# Patient Record
Sex: MALE | Race: WHITE | NOT HISPANIC OR LATINO | Employment: OTHER | URBAN - METROPOLITAN AREA
[De-identification: names, ages, dates, MRNs, and addresses within clinical notes are randomized per-mention and may not be internally consistent; named-entity substitution may affect disease eponyms.]

---

## 2020-12-22 ENCOUNTER — HOSPITAL ENCOUNTER (OUTPATIENT)
Dept: MEDSURG UNIT | Facility: HOSPITAL | Age: 65
End: 2020-12-23
Attending: INTERNAL MEDICINE | Admitting: INTERNAL MEDICINE

## 2020-12-23 LAB
ABS NEUT (OLG): 4.85 X10(3)/MCL (ref 2.1–9.2)
ALBUMIN SERPL-MCNC: 3.5 GM/DL (ref 3.4–4.8)
ALBUMIN/GLOB SERPL: 1.2 RATIO (ref 1.1–2)
ALP SERPL-CCNC: 78 UNIT/L (ref 40–150)
ALT SERPL-CCNC: 18 UNIT/L (ref 0–55)
AST SERPL-CCNC: 16 UNIT/L (ref 5–34)
BASOPHILS # BLD AUTO: 0 X10(3)/MCL (ref 0–0.2)
BASOPHILS NFR BLD AUTO: 0 %
BILIRUB SERPL-MCNC: 0.7 MG/DL
BILIRUBIN DIRECT+TOT PNL SERPL-MCNC: 0.3 MG/DL (ref 0–0.5)
BILIRUBIN DIRECT+TOT PNL SERPL-MCNC: 0.4 MG/DL (ref 0–0.8)
BUN SERPL-MCNC: 13.3 MG/DL (ref 8.4–25.7)
CALCIUM SERPL-MCNC: 8.3 MG/DL (ref 8.8–10)
CHLORIDE SERPL-SCNC: 112 MMOL/L (ref 98–107)
CO2 SERPL-SCNC: 22 MMOL/L (ref 23–31)
CREAT SERPL-MCNC: 0.96 MG/DL (ref 0.73–1.18)
EOSINOPHIL # BLD AUTO: 0.2 X10(3)/MCL (ref 0–0.9)
EOSINOPHIL NFR BLD AUTO: 2 %
ERYTHROCYTE [DISTWIDTH] IN BLOOD BY AUTOMATED COUNT: 11.8 % (ref 11.5–17)
GLOBULIN SER-MCNC: 3 GM/DL (ref 2.4–3.5)
GLUCOSE SERPL-MCNC: 94 MG/DL (ref 82–115)
HCT VFR BLD AUTO: 43 % (ref 42–52)
HGB BLD-MCNC: 14.6 GM/DL (ref 14–18)
LYMPHOCYTES # BLD AUTO: 1.4 X10(3)/MCL (ref 0.6–4.6)
LYMPHOCYTES NFR BLD AUTO: 19 %
MCH RBC QN AUTO: 33.5 PG (ref 27–31)
MCHC RBC AUTO-ENTMCNC: 34 GM/DL (ref 33–36)
MCV RBC AUTO: 98.6 FL (ref 80–94)
MONOCYTES # BLD AUTO: 0.7 X10(3)/MCL (ref 0.1–1.3)
MONOCYTES NFR BLD AUTO: 9 %
NEUTROPHILS # BLD AUTO: 4.85 X10(3)/MCL (ref 2.1–9.2)
NEUTROPHILS NFR BLD AUTO: 68 %
PLATELET # BLD AUTO: 180 X10(3)/MCL (ref 130–400)
PMV BLD AUTO: 9.8 FL (ref 9.4–12.4)
POTASSIUM SERPL-SCNC: 3.9 MMOL/L (ref 3.5–5.1)
PROT SERPL-MCNC: 6.5 GM/DL (ref 5.8–7.6)
RBC # BLD AUTO: 4.36 X10(6)/MCL (ref 4.7–6.1)
SODIUM SERPL-SCNC: 140 MMOL/L (ref 136–145)
WBC # SPEC AUTO: 7.1 X10(3)/MCL (ref 4.5–11.5)

## 2022-04-11 ENCOUNTER — HISTORICAL (OUTPATIENT)
Dept: ADMINISTRATIVE | Facility: HOSPITAL | Age: 67
End: 2022-04-11
Payer: COMMERCIAL

## 2022-04-24 VITALS
SYSTOLIC BLOOD PRESSURE: 158 MMHG | BODY MASS INDEX: 29.05 KG/M2 | DIASTOLIC BLOOD PRESSURE: 82 MMHG | WEIGHT: 180.75 LBS | HEIGHT: 66 IN

## 2022-06-25 ENCOUNTER — HOSPITAL ENCOUNTER (OUTPATIENT)
Facility: HOSPITAL | Age: 67
Discharge: HOME | End: 2022-06-26
Attending: EMERGENCY MEDICINE | Admitting: INTERNAL MEDICINE
Payer: COMMERCIAL

## 2022-06-25 DIAGNOSIS — R55 SYNCOPE, UNSPECIFIED SYNCOPE TYPE: ICD-10-CM

## 2022-06-25 DIAGNOSIS — I95.9 HYPOTENSION, UNSPECIFIED HYPOTENSION TYPE: ICD-10-CM

## 2022-06-25 DIAGNOSIS — E86.0 DEHYDRATION: ICD-10-CM

## 2022-06-25 DIAGNOSIS — R56.9 SEIZURE: ICD-10-CM

## 2022-06-25 DIAGNOSIS — N17.9 AKI (ACUTE KIDNEY INJURY): ICD-10-CM

## 2022-06-25 DIAGNOSIS — R55 SYNCOPE: ICD-10-CM

## 2022-06-25 DIAGNOSIS — I95.9 ACUTE HYPOTENSION: Primary | ICD-10-CM

## 2022-06-25 DIAGNOSIS — I25.10 CORONARY ARTERY DISEASE INVOLVING NATIVE CORONARY ARTERY OF NATIVE HEART WITHOUT ANGINA PECTORIS: ICD-10-CM

## 2022-06-25 LAB
ALBUMIN SERPL-MCNC: 4.1 GM/DL (ref 3.4–4.8)
ALBUMIN/GLOB SERPL: 1.4 RATIO (ref 1.1–2)
ALP SERPL-CCNC: 71 UNIT/L (ref 40–150)
ALT SERPL-CCNC: 34 UNIT/L (ref 0–55)
APPEARANCE UR: CLEAR
AST SERPL-CCNC: 22 UNIT/L (ref 5–34)
BACTERIA #/AREA URNS AUTO: ABNORMAL /HPF
BASOPHILS # BLD AUTO: 0.06 X10(3)/MCL (ref 0–0.2)
BASOPHILS NFR BLD AUTO: 0.4 %
BILIRUB UR QL STRIP.AUTO: NEGATIVE MG/DL
BILIRUBIN DIRECT+TOT PNL SERPL-MCNC: 0.8 MG/DL
BUN SERPL-MCNC: 24.4 MG/DL (ref 8.4–25.7)
CALCIUM SERPL-MCNC: 9.7 MG/DL (ref 8.8–10)
CHLORIDE SERPL-SCNC: 113 MMOL/L (ref 98–107)
CHOLEST SERPL-MCNC: 115 MG/DL
CHOLEST/HDLC SERPL: 3 {RATIO} (ref 0–5)
CK SERPL-CCNC: 83 U/L (ref 30–200)
CO2 SERPL-SCNC: 20 MMOL/L (ref 23–31)
COLOR UR AUTO: YELLOW
CREAT SERPL-MCNC: 2.23 MG/DL (ref 0.73–1.18)
EOSINOPHIL # BLD AUTO: 0.03 X10(3)/MCL (ref 0–0.9)
EOSINOPHIL NFR BLD AUTO: 0.2 %
ERYTHROCYTE [DISTWIDTH] IN BLOOD BY AUTOMATED COUNT: 12.2 % (ref 11.5–17)
EST. AVERAGE GLUCOSE BLD GHB EST-MCNC: 99.7 MG/DL
FLUAV AG UPPER RESP QL IA.RAPID: NOT DETECTED
FLUBV AG UPPER RESP QL IA.RAPID: NOT DETECTED
FOLATE SERPL-MCNC: 7.5 NG/ML (ref 7–31.4)
GLOBULIN SER-MCNC: 2.9 GM/DL (ref 2.4–3.5)
GLUCOSE SERPL-MCNC: 98 MG/DL (ref 82–115)
GLUCOSE UR QL STRIP.AUTO: NEGATIVE MG/DL
HBA1C MFR BLD: 5.1 %
HCT VFR BLD AUTO: 47 % (ref 42–52)
HDLC SERPL-MCNC: 38 MG/DL (ref 35–60)
HGB BLD-MCNC: 15.2 GM/DL (ref 14–18)
IMM GRANULOCYTES # BLD AUTO: 0.13 X10(3)/MCL (ref 0–0.02)
IMM GRANULOCYTES NFR BLD AUTO: 0.9 % (ref 0–0.43)
KETONES UR QL STRIP.AUTO: ABNORMAL MG/DL
LDLC SERPL CALC-MCNC: 57 MG/DL (ref 50–140)
LEFT CCA DIST SYS: 107 CM/S
LEFT CCA PROX SYS: 96 CM/S
LEFT ECA SYS: 144 CM/S
LEFT ICA DIST DIAS: 19 CM/S
LEFT ICA DIST SYS: 50 CM/S
LEFT ICA MID DIAS: 21 CM/S
LEFT ICA MID SYS: 70 CM/S
LEFT ICA PROX DIAS: 16 CM/S
LEFT ICA PROX SYS: 91 CM/S
LEFT VERTEBRAL SYS: 50 CM/S
LEUKOCYTE ESTERASE UR QL STRIP.AUTO: ABNORMAL UNIT/L
LYMPHOCYTES # BLD AUTO: 1.2 X10(3)/MCL (ref 0.6–4.6)
LYMPHOCYTES NFR BLD AUTO: 8.4 %
MCH RBC QN AUTO: 32.8 PG (ref 27–31)
MCHC RBC AUTO-ENTMCNC: 32.3 MG/DL (ref 33–36)
MCV RBC AUTO: 101.5 FL (ref 80–94)
MONOCYTES # BLD AUTO: 1.02 X10(3)/MCL (ref 0.1–1.3)
MONOCYTES NFR BLD AUTO: 7.2 %
NEUTROPHILS # BLD AUTO: 11.8 X10(3)/MCL (ref 2.1–9.2)
NEUTROPHILS NFR BLD AUTO: 82.9 %
NITRITE UR QL STRIP.AUTO: NEGATIVE
NRBC BLD AUTO-RTO: 0 %
OHS CV CAROTID RIGHT ICA EDV HIGHEST: 27
OHS CV CAROTID ULTRASOUND LEFT ICA/CCA RATIO: 0.85
OHS CV CAROTID ULTRASOUND RIGHT ICA/CCA RATIO: 0.59
OHS CV PV CAROTID LEFT HIGHEST CCA: 107
OHS CV PV CAROTID LEFT HIGHEST ICA: 91
OHS CV PV CAROTID RIGHT HIGHEST CCA: 136
OHS CV PV CAROTID RIGHT HIGHEST ICA: 80
OHS CV US CAROTID LEFT HIGHEST EDV: 21
PH UR STRIP.AUTO: 5.5 [PH]
PLATELET # BLD AUTO: 182 X10(3)/MCL (ref 130–400)
PMV BLD AUTO: 9.6 FL (ref 9.4–12.4)
POTASSIUM SERPL-SCNC: 4.9 MMOL/L (ref 3.5–5.1)
PROT SERPL-MCNC: 7 GM/DL (ref 5.8–7.6)
PROT UR QL STRIP.AUTO: ABNORMAL MG/DL
RBC # BLD AUTO: 4.63 X10(6)/MCL (ref 4.7–6.1)
RBC #/AREA URNS AUTO: <5 /HPF
RBC UR QL AUTO: NEGATIVE UNIT/L
RIGHT CCA DIST SYS: 136 CM/S
RIGHT CCA PROX SYS: 136 CM/S
RIGHT ECA SYS: 230 CM/S
RIGHT ICA DIST DIAS: 22 CM/S
RIGHT ICA DIST SYS: 69 CM/S
RIGHT ICA MID DIAS: 27 CM/S
RIGHT ICA MID SYS: 72 CM/S
RIGHT ICA PROX DIAS: 20 CM/S
RIGHT ICA PROX SYS: 80 CM/S
RIGHT VERTEBRAL SYS: 20 CM/S
SARS-COV-2 RNA RESP QL NAA+PROBE: NOT DETECTED
SODIUM SERPL-SCNC: 141 MMOL/L (ref 136–145)
SP GR UR STRIP.AUTO: 1.02 (ref 1–1.03)
SQUAMOUS #/AREA URNS AUTO: <5 /HPF
TRIGL SERPL-MCNC: 99 MG/DL (ref 34–140)
TROPONIN I SERPL-MCNC: <0.01 NG/ML (ref 0–0.04)
UROBILINOGEN UR STRIP-ACNC: 0.2 MG/DL
VIT B12 SERPL-MCNC: 355 PG/ML (ref 213–816)
VLDLC SERPL CALC-MCNC: 20 MG/DL
WBC # SPEC AUTO: 14.2 X10(3)/MCL (ref 4.5–11.5)
WBC #/AREA URNS AUTO: 10 /HPF

## 2022-06-25 PROCEDURE — 25000003 PHARM REV CODE 250: Performed by: EMERGENCY MEDICINE

## 2022-06-25 PROCEDURE — 82746 ASSAY OF FOLIC ACID SERUM: CPT | Performed by: INTERNAL MEDICINE

## 2022-06-25 PROCEDURE — 96360 HYDRATION IV INFUSION INIT: CPT | Mod: 59

## 2022-06-25 PROCEDURE — 84484 ASSAY OF TROPONIN QUANT: CPT | Performed by: EMERGENCY MEDICINE

## 2022-06-25 PROCEDURE — 63600175 PHARM REV CODE 636 W HCPCS: Performed by: EMERGENCY MEDICINE

## 2022-06-25 PROCEDURE — 80053 COMPREHEN METABOLIC PANEL: CPT | Performed by: EMERGENCY MEDICINE

## 2022-06-25 PROCEDURE — 82607 VITAMIN B-12: CPT | Performed by: INTERNAL MEDICINE

## 2022-06-25 PROCEDURE — 36415 COLL VENOUS BLD VENIPUNCTURE: CPT | Performed by: EMERGENCY MEDICINE

## 2022-06-25 PROCEDURE — G0378 HOSPITAL OBSERVATION PER HR: HCPCS

## 2022-06-25 PROCEDURE — 96372 THER/PROPH/DIAG INJ SC/IM: CPT | Performed by: INTERNAL MEDICINE

## 2022-06-25 PROCEDURE — 25000003 PHARM REV CODE 250: Performed by: INTERNAL MEDICINE

## 2022-06-25 PROCEDURE — 81001 URINALYSIS AUTO W/SCOPE: CPT | Performed by: EMERGENCY MEDICINE

## 2022-06-25 PROCEDURE — 93005 ELECTROCARDIOGRAM TRACING: CPT

## 2022-06-25 PROCEDURE — 96361 HYDRATE IV INFUSION ADD-ON: CPT

## 2022-06-25 PROCEDURE — 82550 ASSAY OF CK (CPK): CPT | Performed by: EMERGENCY MEDICINE

## 2022-06-25 PROCEDURE — 85025 COMPLETE CBC W/AUTO DIFF WBC: CPT | Performed by: EMERGENCY MEDICINE

## 2022-06-25 PROCEDURE — 83036 HEMOGLOBIN GLYCOSYLATED A1C: CPT | Performed by: PHYSICIAN ASSISTANT

## 2022-06-25 PROCEDURE — 80061 LIPID PANEL: CPT | Performed by: PHYSICIAN ASSISTANT

## 2022-06-25 PROCEDURE — 99291 CRITICAL CARE FIRST HOUR: CPT | Mod: 25

## 2022-06-25 PROCEDURE — 36415 COLL VENOUS BLD VENIPUNCTURE: CPT | Performed by: PHYSICIAN ASSISTANT

## 2022-06-25 PROCEDURE — 87636 SARSCOV2 & INF A&B AMP PRB: CPT | Performed by: EMERGENCY MEDICINE

## 2022-06-25 PROCEDURE — 63600175 PHARM REV CODE 636 W HCPCS: Performed by: INTERNAL MEDICINE

## 2022-06-25 RX ORDER — SODIUM CHLORIDE 0.9 % (FLUSH) 0.9 %
10 SYRINGE (ML) INJECTION EVERY 12 HOURS PRN
Status: DISCONTINUED | OUTPATIENT
Start: 2022-06-25 | End: 2022-06-26 | Stop reason: HOSPADM

## 2022-06-25 RX ORDER — ACETAMINOPHEN 325 MG/1
650 TABLET ORAL EVERY 8 HOURS PRN
Status: DISCONTINUED | OUTPATIENT
Start: 2022-06-25 | End: 2022-06-26 | Stop reason: HOSPADM

## 2022-06-25 RX ORDER — PANTOPRAZOLE SODIUM 40 MG/1
40 TABLET, DELAYED RELEASE ORAL DAILY
COMMUNITY

## 2022-06-25 RX ORDER — ENOXAPARIN SODIUM 100 MG/ML
30 INJECTION SUBCUTANEOUS EVERY 24 HOURS
Status: DISCONTINUED | OUTPATIENT
Start: 2022-06-25 | End: 2022-06-26 | Stop reason: HOSPADM

## 2022-06-25 RX ORDER — ATORVASTATIN CALCIUM 10 MG/1
20 TABLET, FILM COATED ORAL NIGHTLY
Status: DISCONTINUED | OUTPATIENT
Start: 2022-06-25 | End: 2022-06-26 | Stop reason: HOSPADM

## 2022-06-25 RX ORDER — ZONISAMIDE 100 MG/1
100 CAPSULE ORAL NIGHTLY
Status: DISCONTINUED | OUTPATIENT
Start: 2022-06-25 | End: 2022-06-26 | Stop reason: HOSPADM

## 2022-06-25 RX ORDER — LISINOPRIL 5 MG/1
5 TABLET ORAL DAILY
COMMUNITY
End: 2023-02-10

## 2022-06-25 RX ORDER — METOPROLOL TARTRATE 25 MG/1
25 TABLET, FILM COATED ORAL 2 TIMES DAILY
Status: DISCONTINUED | OUTPATIENT
Start: 2022-06-25 | End: 2022-06-26 | Stop reason: HOSPADM

## 2022-06-25 RX ORDER — IBUPROFEN 200 MG
16 TABLET ORAL
Status: DISCONTINUED | OUTPATIENT
Start: 2022-06-25 | End: 2022-06-26 | Stop reason: HOSPADM

## 2022-06-25 RX ORDER — ASPIRIN 81 MG/1
81 TABLET ORAL DAILY
COMMUNITY

## 2022-06-25 RX ORDER — SODIUM CHLORIDE 9 MG/ML
INJECTION, SOLUTION INTRAVENOUS CONTINUOUS
Status: DISCONTINUED | OUTPATIENT
Start: 2022-06-25 | End: 2022-06-25

## 2022-06-25 RX ORDER — ZONISAMIDE 100 MG/1
100 CAPSULE ORAL NIGHTLY
COMMUNITY
End: 2023-02-10

## 2022-06-25 RX ORDER — SODIUM CHLORIDE, SODIUM LACTATE, POTASSIUM CHLORIDE, CALCIUM CHLORIDE 600; 310; 30; 20 MG/100ML; MG/100ML; MG/100ML; MG/100ML
INJECTION, SOLUTION INTRAVENOUS CONTINUOUS
Status: DISCONTINUED | OUTPATIENT
Start: 2022-06-25 | End: 2022-06-26 | Stop reason: HOSPADM

## 2022-06-25 RX ORDER — METOPROLOL TARTRATE 25 MG/1
25 TABLET, FILM COATED ORAL 2 TIMES DAILY
COMMUNITY
End: 2023-02-10

## 2022-06-25 RX ORDER — LEVETIRACETAM 750 MG/1
1500 TABLET ORAL 2 TIMES DAILY
COMMUNITY
End: 2022-09-09 | Stop reason: SDUPTHER

## 2022-06-25 RX ORDER — TAMSULOSIN HYDROCHLORIDE 0.4 MG/1
CAPSULE ORAL DAILY
COMMUNITY

## 2022-06-25 RX ORDER — GLUCAGON 1 MG
1 KIT INJECTION
Status: DISCONTINUED | OUTPATIENT
Start: 2022-06-25 | End: 2022-06-26 | Stop reason: HOSPADM

## 2022-06-25 RX ORDER — IBUPROFEN 200 MG
24 TABLET ORAL
Status: DISCONTINUED | OUTPATIENT
Start: 2022-06-25 | End: 2022-06-26 | Stop reason: HOSPADM

## 2022-06-25 RX ORDER — ATORVASTATIN CALCIUM 20 MG/1
20 TABLET, FILM COATED ORAL NIGHTLY
COMMUNITY

## 2022-06-25 RX ORDER — ONDANSETRON 2 MG/ML
4 INJECTION INTRAMUSCULAR; INTRAVENOUS EVERY 4 HOURS PRN
Status: DISCONTINUED | OUTPATIENT
Start: 2022-06-25 | End: 2022-06-26 | Stop reason: HOSPADM

## 2022-06-25 RX ORDER — ASPIRIN 81 MG/1
81 TABLET ORAL DAILY
Status: DISCONTINUED | OUTPATIENT
Start: 2022-06-26 | End: 2022-06-26 | Stop reason: HOSPADM

## 2022-06-25 RX ORDER — LEVETIRACETAM 500 MG/1
1500 TABLET ORAL 2 TIMES DAILY
Status: DISCONTINUED | OUTPATIENT
Start: 2022-06-25 | End: 2022-06-26 | Stop reason: HOSPADM

## 2022-06-25 RX ORDER — ACETAMINOPHEN 325 MG/1
650 TABLET ORAL EVERY 4 HOURS PRN
Status: DISCONTINUED | OUTPATIENT
Start: 2022-06-25 | End: 2022-06-26 | Stop reason: HOSPADM

## 2022-06-25 RX ORDER — NITROGLYCERIN 0.4 MG/1
0.4 TABLET SUBLINGUAL EVERY 5 MIN PRN
COMMUNITY

## 2022-06-25 RX ORDER — TAMSULOSIN HYDROCHLORIDE 0.4 MG/1
0.4 CAPSULE ORAL DAILY
Status: DISCONTINUED | OUTPATIENT
Start: 2022-06-26 | End: 2022-06-26 | Stop reason: HOSPADM

## 2022-06-25 RX ADMIN — ENOXAPARIN SODIUM 30 MG: 30 INJECTION SUBCUTANEOUS at 06:06

## 2022-06-25 RX ADMIN — ATORVASTATIN CALCIUM 20 MG: 10 TABLET, FILM COATED ORAL at 08:06

## 2022-06-25 RX ADMIN — METOPROLOL TARTRATE 25 MG: 25 TABLET, FILM COATED ORAL at 08:06

## 2022-06-25 RX ADMIN — SODIUM CHLORIDE, POTASSIUM CHLORIDE, SODIUM LACTATE AND CALCIUM CHLORIDE: 600; 310; 30; 20 INJECTION, SOLUTION INTRAVENOUS at 03:06

## 2022-06-25 RX ADMIN — SODIUM CHLORIDE 1000 ML: 9 INJECTION, SOLUTION INTRAVENOUS at 01:06

## 2022-06-25 RX ADMIN — LEVETIRACETAM 1500 MG: 500 TABLET, FILM COATED ORAL at 08:06

## 2022-06-25 NOTE — H&P
[facility] Medicine History & Physical Examination       Patient Name: Michael Larson  MRN: [id number]  Patient Class: OP- Observation   Admission Date: 6/25/2022   Admitting Physician: LEEROY Service   Length of Stay: 0  Attending Physician: Brock Rodriguez MD  Primary Care Provider: No primary care provider on file.  Face-to-Face encounter date: 06/25/2022  Code Status: Full  Chief Complaint: Loss of Consciousness        Patient information was obtained from patient, patient's family, past medical records and ER records.     HISTORY OF PRESENT ILLNESS:   Michael Larson is a 66 y.o. male past medical history of hypertension, hyperlipidemia, coronary artery disease status post MI/PCI x3 total 2 years ago, seizure disorder, BPH admitted 06/25/2022 after he was brought into ED by [facility] .  History was provided by the patient and his wife in the room.  Patient reported that his blood pressure has been running normal but this morning he to his blood pressure mention and he and his wife went for fishing trip.  Stated he remained in the shade as well as was hydrating well but around mid day, he started feeling weak.  Developed spots in his vision which grew stronger and then he became flaccid and in able to move and unable to speak to his wife.  EMS was called in and his systolic blood pressure was reported to be in 80s.  Patient received fluid boluses in the ED and his blood pressure responded.  Patient denied any chest pain, shortness of breath.  Patient reported in the last week he was twice cutting the grass on his tractor and has been trying to hydrate himself.      Upon my evaluation in the ED, patient provided me with the whole history, was able to answer all my questions. He does seems a little anxious  Discussed with patient that it looks like that he is severely dehydrated given his creatinine has bumped up to 2.2, which was normal last time.  Discusses CT of the head is negative.   Rest of the labs showed MCV of 101. Patient denied alcohol use states he drinks only occasionally.  Discussed will order folic acid and B12 levels, mild leukocytosis which could be reactive to dehydration.  His lipid profile is at goal, troponin is less than 0.010  A1c is 5.1, nondiabetic  Influenza a and B and COVID is negative  Urinalysis shows trace ketones and trace leukocyte with no bacteria and nitrite  Carotid ultrasound and echocardiogram are pending reports    Discussed will admit him under observation, will hydrate him, and if renal functions normalized by tomorrow with hydration, possible discharge home.  Answered all their questions to the best of my knowledge in their satisfaction    PAST MEDICAL HISTORY:   Hypertension  Hyperlipidemia  Coronary artery disease/MI/PCI x3  Seizure disorder  BPH    PAST SURGICAL HISTORY:   Coronary angiogram with PCI    ALLERGIES:   Patient has no known allergies.    FAMILY HISTORY:   Father diseased, had Alzheimer's.   in his 80s  Mother diseased with tuberculosis at age 42    SOCIAL HISTORY:     Social History     Tobacco Use    Smoking status: Never smoker    Smokeless tobacco: Never   Substance Use Topics    Alcohol use:  Drugs: Occasional  Never          HOME MEDICATIONS:     Prior to Admission medications    Medication Sig Start Date End Date Taking? Authorizing Provider   aspirin (ECOTRIN) 81 MG EC tablet Take 81 mg by mouth once daily.   Yes Historical Provider   atorvastatin (LIPITOR) 20 MG tablet Take 20 mg by mouth every evening.   Yes Historical Provider   levETIRAcetam (KEPPRA) 750 MG Tab Take 1,500 mg by mouth 2 (two) times daily.   Yes Historical Provider   lisinopriL (PRINIVIL,ZESTRIL) 5 MG tablet Take 5 mg by mouth once daily.   Yes Historical Provider   metoprolol tartrate (LOPRESSOR) 25 MG tablet Take 25 mg by mouth 2 (two) times daily.   Yes Historical Provider   pantoprazole (PROTONIX) 40 MG tablet Take 40 mg by mouth once daily.   Yes  Historical Provider   tamsulosin (FLOMAX) 0.4 mg Cap Take by mouth once daily.   Yes Historical Provider   zonisamide (ZONEGRAN) 100 MG Cap Take 100 mg by mouth nightly.   Yes Historical Provider   nitroGLYCERIN (NITROSTAT) 0.4 MG SL tablet Place 0.4 mg under the tongue every 5 (five) minutes as needed for Chest pain.    Historical Provider       REVIEW OF SYSTEMS:   Except as documented, all other systems reviewed and negative     PHYSICAL EXAM:     VITAL SIGNS: 24 HRS MIN & MAX LAST   No data recorded     BP  Min: 88/56  Max: 147/74 112/73   Pulse  Min: 82  Max: 95  86   Resp  Min: 16  Max: 20 20   SpO2  Min: 96 %  Max: 100 % 96 %       General appearance: Well-developed, well-nourished male in no apparent distress.  HENT: Atraumatic head. Moist mucous membranes of oral cavity.  Slight stutter in speech- wife reported as normal when he is nervous  Eyes: Normal extraocular movements.   Neck: Supple.   Lungs: Clear to auscultation bilaterally. No wheezing present.   Heart: Regular rate and rhythm. S1 and S2 present with no murmurs/gallop/rub. No pedal edema. No JVD present.   Abdomen: Soft, non-distended, non-tender. No rebound tenderness/guarding. Bowel sounds are normal.   Extremities: No cyanosis, clubbing, or edema.  Skin: No Rash.   Neuro: Motor and sensory exams grossly intact. Good tone. Muscle strength 5/5 in all 4 extremities, no neurologic deficit  Psych/mental status: Appropriate mood and affect. Responds appropriately to questions.     LABS AND IMAGING:     Recent Labs   Lab 06/25/22  1345   WBC 14.2*   RBC 4.63*   HGB 15.2   HCT 47.0   .5*   MCH 32.8*   MCHC 32.3*   RDW 12.2      MPV 9.6       Recent Labs   Lab 06/25/22  1345      K 4.9   CO2 20*   BUN 24.4   CREATININE 2.23*   CALCIUM 9.7   ALBUMIN 4.1   ALKPHOS 71   ALT 34   AST 22   BILITOT 0.8       Microbiology Results (last 7 days)     ** No results found for the last 168 hours. **           CT Head Without Contrast  Narrative:  EXAMINATION:  CT HEAD WITHOUT CONTRAST    CLINICAL HISTORY:  syncope;    TECHNIQUE:  Sequential axial images were performed of the brain without contrast.    Dose product length of 911 mGycm. Automated exposure control was utilized to minimize radiation dose.    COMPARISON:  None avail.    FINDINGS:  There is no intracranial mass effect, midline shift, hydrocephalus or hemorrhage. There is no sulcal effacement or low attenuation changes to suggest recent large vessel territory infarction. Chronic appearing periventricular and subcortical white matter low attenuation changes are consistent with chronic microangiopathic ischemia. The ventricular system and sulcal markings prominence is consistent with atrophy. There is no acute extra axial fluid collection. Visualized paranasal sinuses are clear without mucosal thickening, polypoidal abnormality or air-fluid levels. Mastoid air cells aeration is optimal.  Impression: 1.  No acute intracranial findings identified.    2.  Chronic microangiopathic ischemia and atrophy.    Electronically signed by: Husam Luna  Date:    06/25/2022  Time:    15:24  X-Ray Chest AP Portable  Narrative: EXAMINATION:  XR CHEST AP PORTABLE    CLINICAL HISTORY:  syncope;    COMPARISON:  28 July 2020    FINDINGS:  Portable frontal view of the chest was obtained. Heart is not enlarged.  Low lung volumes.  Suspect some bibasilar atelectasis or scarring.  This is similar to prior.  The lungs are otherwise clear.  No pneumothorax.  Impression: No acute findings.  Stable chest radiograph.    Electronically signed by: Patrick Espino  Date:    06/25/2022  Time:    14:15        ASSESSMENT & PLAN:   Acute hypotension with loss of consciousness  Acute dehydration  CATHI  Hypertension, hyperlipidemia  Known CAD-PCI x3- known to Dr. Mitchell  Macrocytosis  Seizure disorder  BPH    Admitted to hospitalist service under observation on 06/25/2022  Received 1 L normal saline bolus in the ED, BP  responded  Hypotension now resolved  Continue Ringer lactate at 100 cc/hour to recover renal functions  Blood pressure has improved to 117 over 75 while I was in the room  Ordered echocardiograms  Ordered carotid ultrasound  CT of the head without contrast is negative for any acute finding and patient has no neurologic deficit either  I had a long conversation with the patient regarding proper hydration when outside in this heat index.  Discussed use of 64 oz of water 1 in controlled environment but when outdoors, have to replace fluids with Gatorade/Powerade to maintain electrolytes as well  I ordered folate and B12 levels as an add on given macrocytosis and no history of alcoholism  Resumed appropriate home medication for chronic medical conditions, hold lisinopril and placed parameters for metoprolol  Morning CBC, CMP ordered      VTE Prophylaxis: will be placed on Lovenox 30mg s/c for DVT prophylaxis and will be advised to be as mobile as possible and sit in a chair as tolerated    Patient condition:  Stable    Non smoker    Advanced Directives:  I spent 15 mins of face to face discussion regarding advanced directives and end of life planning which included the patient and patients family, me that he does not have a living will but he would like to be a full code    Critical care note:  Critical care diagnosis:  Hypotension requiring IV fluid bolus  Critical care interventions: Hands-on evaluation, review of labs/radiographs/records and discussion with patient and family if present  Critical care time spent: 45 minutes      __________________________________________________________________________  INPATIENT LIST OF MEDICATIONS     Scheduled Meds:   [START ON 6/26/2022] aspirin  81 mg Oral Daily    atorvastatin  20 mg Oral QHS    levETIRAcetam  1,500 mg Oral BID    metoprolol tartrate  25 mg Oral BID    [START ON 6/26/2022] tamsulosin  0.4 mg Oral Daily    zonisamide  100 mg Oral Nightly     Continuous  Infusions:   lactated ringers 100 mL/hr at 06/25/22 1545     PRN Meds:.acetaminophen, acetaminophen, dextrose 50%, dextrose 50%, glucagon (human recombinant), glucose, glucose, ondansetron, sodium chloride 0.9%      Brock Rodriguez MD   06/25/2022

## 2022-06-25 NOTE — ED PROVIDER NOTES
Encounter Date: 6/25/2022    SCRIBE #1 NOTE: I, Zain Cardoso, am scribing for, and in the presence of,  Alysa Murray MD. I have scribed the following portions of the note - Other sections scribed: HPI, ROS, physical exam.       History     Chief Complaint   Patient presents with    Loss of Consciousness     67 y/o male with a history of CAD and MI presenting to the ED via [facility] after pt had a syncopal episode today while out fishing with his wife. Pt states they were outside since 0800 this morning and at some point he began seeing spots in his vision so he sat down in the shade on the boat and put wet rags on himself to cool down. He states his vision continued to get worse until he was unable to move or respond to his wife. He states he felt weak at this time and his neck was hurting however he denies any CP, palpitations, or SOB. Pt states he was hydrating the entire time while out in the sun and he also states he works outside in the heat often and this doesn't usually happen. However he states he did have a similar episode at home before in which he passed out presumably due to heat exhaustion. Per [facility] pt was hypotensive on arrival with an SBP in the 80's. Pt states he took his BP meds this am despite his BP being normal before taking them. He states he hasn't been taking his BP meds sometimes lately due to it being controlled. Pt states he is not on any fluid pills.    Pt does not have a PCP. His cardiologist is Dr. Mitchell.    The history is provided by the patient and the EMS personnel. No  was used.   Loss of Consciousness  This is a new problem. The problem has been gradually improving. Pertinent negatives include no chest pain, no abdominal pain, no headaches and no shortness of breath. Nothing aggravates the symptoms. Nothing relieves the symptoms.     Review of patient's allergies indicates:  No Known Allergies  No past medical history on file.  No past surgical history  on file.  No family history on file.     Review of Systems   Constitutional: Negative for chills, diaphoresis and fever.   HENT: Negative for congestion, ear pain, sinus pain and sore throat.    Eyes: Positive for visual disturbance. Negative for pain and discharge.   Respiratory: Negative for cough, shortness of breath, wheezing and stridor.    Cardiovascular: Positive for syncope. Negative for chest pain, palpitations and leg swelling.   Gastrointestinal: Negative for abdominal pain, constipation, diarrhea, nausea, rectal pain and vomiting.   Genitourinary: Negative for dysuria and hematuria.   Musculoskeletal: Positive for neck pain. Negative for back pain and myalgias.   Skin: Negative for rash.   Neurological: Positive for syncope and weakness (generalized). Negative for dizziness, numbness and headaches.   Hematological: Negative.    Psychiatric/Behavioral: Negative.    All other systems reviewed and are negative.      Physical Exam     Initial Vitals [06/25/22 1248]   BP Pulse Resp Temp SpO2   (!) 88/56 82 16 -- 98 %      MAP       --         Physical Exam    Nursing note and vitals reviewed.  Constitutional: He appears well-developed and well-nourished. He is not diaphoretic. No distress.   HENT:   Head: Normocephalic and atraumatic.   Nose: Nose normal.   Mouth/Throat: Oropharynx is clear and moist.   Eyes: Conjunctivae and EOM are normal. Pupils are equal, round, and reactive to light.   Neck: Trachea normal. Neck supple.   Normal range of motion.  Cardiovascular: Normal rate, regular rhythm, normal heart sounds and intact distal pulses. Exam reveals no gallop and no friction rub.    No murmur heard.  Pulmonary/Chest: Breath sounds normal. No respiratory distress. He has no wheezes. He has no rhonchi. He has no rales. He exhibits no tenderness.   Abdominal: Abdomen is soft. Bowel sounds are normal. He exhibits no distension and no mass. There is no abdominal tenderness. There is no rebound.    Musculoskeletal:         General: No tenderness or edema. Normal range of motion.      Cervical back: Normal range of motion and neck supple.      Lumbar back: Normal. No tenderness. Normal range of motion.     Neurological: He is alert and oriented to person, place, and time. He has normal strength. No cranial nerve deficit or sensory deficit.   Skin: Skin is warm and dry. Capillary refill takes less than 2 seconds. No rash and no abscess noted. No erythema. No pallor.   Psychiatric: He has a normal mood and affect. His behavior is normal. Judgment and thought content normal.         ED Course   Critical Care    Date/Time: 6/25/2022 2:47 PM  Performed by: Alysa Murray MD  Authorized by: Alysa Murray MD   Direct patient critical care time: 15 minutes  Additional history critical care time: 7 minutes  Ordering / reviewing critical care time: 7 minutes  Documentation critical care time: 8 minutes  Consulting other physicians critical care time: 5 minutes  Total critical care time (exclusive of procedural time) : 42 minutes  Critical care was necessary to treat or prevent imminent or life-threatening deterioration of the following conditions: circulatory failure, renal failure and dehydration.  Critical care was time spent personally by me on the following activities: discussions with consultants, evaluation of patient's response to treatment, obtaining history from patient or surrogate, ordering and review of laboratory studies, pulse oximetry, review of old charts, development of treatment plan with patient or surrogate, examination of patient, ordering and performing treatments and interventions, ordering and review of radiographic studies and re-evaluation of patient's condition.        Labs Reviewed   CBC W/ AUTO DIFFERENTIAL    Narrative:     The following orders were created for panel order CBC auto differential.  Procedure                               Abnormality         Status                      ---------                               -----------         ------                     CBC with Differential[[id number]]                                                         Please view results for these tests on the individual orders.   COMPREHENSIVE METABOLIC PANEL   COVID/FLU A&B PCR   TROPONIN I   CBC WITH DIFFERENTIAL     EKG Readings: (Independently Interpreted)   Initial Reading: No STEMI. Rhythm: Normal Sinus Rhythm. Heart Rate: 87. Ectopy: No Ectopy. Conduction: Normal. ST Segments: Normal ST Segments. T Waves: Normal. Axis: Normal.   Time 1356       Imaging Results          X-Ray Chest AP Portable (In process)  Result time 06/25/22 13:30:58                 Medications   sodium chloride 0.9% bolus 1,000 mL (has no administration in time range)     Medical Decision Making:   History:   I obtained history from: EMS provider.  Old Medical Records: I decided to obtain old medical records.  Old Records Summarized: records from previous admission(s).  Initial Assessment:   Hypotension, syncope  Differential Diagnosis:   Leo, dysrhythmia, hypotension, mi  Clinical Tests:   Radiological Study: Ordered and Reviewed  ED Management:  Ivf, labs, cxr, admit  Other:   I have discussed this case with another health care provider.          Scribe Attestation:   Scribe #1: I performed the above scribed service and the documentation accurately describes the services I performed. I attest to the accuracy of the note.  Comments: Attending:   Physician Attestation Statement for Scribe #1: Alysa BUTCHER MD, personally performed the services described in this documentation. All medical record entries made by the scribe were at my direction and in my presence.  I have reviewed the chart and agree that the record reflects my personal performance and is accurate and complete.      Attending Attestation:           Physician Attestation for Scribe:  Physician Attestation Statement for Scribe #1: Alysa BUTCHER MD,  reviewed documentation, as scribed by Zain Cardoso in my presence, and it is both accurate and complete.             ED Course as of 06/25/22 1446   Sat Jun 25, 2022   1439 Hospitalist paged [KH]      ED Course User Index  [KH] Zain Cardoso             Clinical Impression:   Final diagnoses:  [R55] Syncope                 Alysa Murray MD  06/25/22 0094

## 2022-06-26 VITALS
RESPIRATION RATE: 18 BRPM | BODY MASS INDEX: 29.09 KG/M2 | HEART RATE: 65 BPM | TEMPERATURE: 98 F | WEIGHT: 181 LBS | SYSTOLIC BLOOD PRESSURE: 123 MMHG | DIASTOLIC BLOOD PRESSURE: 79 MMHG | HEIGHT: 66 IN | OXYGEN SATURATION: 96 %

## 2022-06-26 PROBLEM — R55 SYNCOPE: Status: ACTIVE | Noted: 2022-06-26

## 2022-06-26 PROBLEM — N17.9 AKI (ACUTE KIDNEY INJURY): Status: ACTIVE | Noted: 2022-06-26

## 2022-06-26 PROBLEM — E86.0 DEHYDRATION: Status: RESOLVED | Noted: 2022-06-26 | Resolved: 2022-06-26

## 2022-06-26 PROBLEM — I95.9 ACUTE HYPOTENSION: Status: RESOLVED | Noted: 2022-06-26 | Resolved: 2022-06-26

## 2022-06-26 PROBLEM — R55 SYNCOPE: Status: RESOLVED | Noted: 2022-06-26 | Resolved: 2022-06-26

## 2022-06-26 PROBLEM — R56.9 SEIZURE: Status: ACTIVE | Noted: 2022-06-26

## 2022-06-26 PROBLEM — I95.9 ACUTE HYPOTENSION: Status: ACTIVE | Noted: 2022-06-26

## 2022-06-26 PROBLEM — I25.10 CORONARY ARTERY DISEASE INVOLVING NATIVE CORONARY ARTERY WITHOUT ANGINA PECTORIS: Status: ACTIVE | Noted: 2022-06-26

## 2022-06-26 PROBLEM — E86.0 DEHYDRATION: Status: ACTIVE | Noted: 2022-06-26

## 2022-06-26 LAB
ALBUMIN SERPL-MCNC: 3.6 GM/DL (ref 3.4–4.8)
ALBUMIN/GLOB SERPL: 1.5 RATIO (ref 1.1–2)
ALP SERPL-CCNC: 62 UNIT/L (ref 40–150)
ALT SERPL-CCNC: 26 UNIT/L (ref 0–55)
AST SERPL-CCNC: 17 UNIT/L (ref 5–34)
AV INDEX (PROSTH): 0.59
AV MEAN GRADIENT: 4 MMHG
AV PEAK GRADIENT: 7 MMHG
AV VELOCITY RATIO: 0.64
BASOPHILS # BLD AUTO: 0.05 X10(3)/MCL (ref 0–0.2)
BASOPHILS NFR BLD AUTO: 0.6 %
BILIRUBIN DIRECT+TOT PNL SERPL-MCNC: 1 MG/DL
BUN SERPL-MCNC: 18.6 MG/DL (ref 8.4–25.7)
CALCIUM SERPL-MCNC: 9 MG/DL (ref 8.8–10)
CHLORIDE SERPL-SCNC: 110 MMOL/L (ref 98–107)
CO2 SERPL-SCNC: 24 MMOL/L (ref 23–31)
CREAT SERPL-MCNC: 1.25 MG/DL (ref 0.73–1.18)
CV ECHO LV RWT: 0.78 CM
DOP CALC AO PEAK VEL: 1.36 M/S
DOP CALC AO VTI: 22.4 CM
DOP CALC LVOT PEAK VEL: 0.87 M/S
DOP CALCLVOT PEAK VEL VTI: 13.3 CM
E WAVE DECELERATION TIME: 187 MSEC
E/A RATIO: 0.84
E/E' RATIO: 6.89 M/S
ECHO LV POSTERIOR WALL: 1.42 CM (ref 0.6–1.1)
EJECTION FRACTION: 65 %
EOSINOPHIL # BLD AUTO: 0.17 X10(3)/MCL (ref 0–0.9)
EOSINOPHIL NFR BLD AUTO: 1.9 %
ERYTHROCYTE [DISTWIDTH] IN BLOOD BY AUTOMATED COUNT: 12.1 % (ref 11.5–17)
FRACTIONAL SHORTENING: 29 % (ref 28–44)
GLOBULIN SER-MCNC: 2.4 GM/DL (ref 2.4–3.5)
GLUCOSE SERPL-MCNC: 105 MG/DL (ref 82–115)
HCT VFR BLD AUTO: 43 % (ref 42–52)
HGB BLD-MCNC: 14.2 GM/DL (ref 14–18)
IMM GRANULOCYTES # BLD AUTO: 0.03 X10(3)/MCL (ref 0–0.02)
IMM GRANULOCYTES NFR BLD AUTO: 0.3 % (ref 0–0.43)
INTERVENTRICULAR SEPTUM: 1.39 CM (ref 0.6–1.1)
LEFT ATRIUM SIZE: 3.9 CM
LEFT ATRIUM VOLUME MOD: 35.9 CM3
LEFT INTERNAL DIMENSION IN SYSTOLE: 2.57 CM (ref 2.1–4)
LEFT VENTRICLE DIASTOLIC VOLUME: 82 ML
LEFT VENTRICLE SYSTOLIC VOLUME: 29 ML
LEFT VENTRICULAR INTERNAL DIMENSION IN DIASTOLE: 3.62 CM (ref 3.5–6)
LEFT VENTRICULAR MASS: 182.32 G
LV LATERAL E/E' RATIO: 6.89 M/S
LV SEPTAL E/E' RATIO: 6.89 M/S
LVOT MG: 2 MMHG
LVOT MV: 0.57 CM/S
LYMPHOCYTES # BLD AUTO: 2.09 X10(3)/MCL (ref 0.6–4.6)
LYMPHOCYTES NFR BLD AUTO: 23 %
MCH RBC QN AUTO: 33 PG (ref 27–31)
MCHC RBC AUTO-ENTMCNC: 33 MG/DL (ref 33–36)
MCV RBC AUTO: 100 FL (ref 80–94)
MONOCYTES # BLD AUTO: 0.84 X10(3)/MCL (ref 0.1–1.3)
MONOCYTES NFR BLD AUTO: 9.2 %
MV PEAK A VEL: 0.74 M/S
MV PEAK E VEL: 0.62 M/S
NEUTROPHILS # BLD AUTO: 5.9 X10(3)/MCL (ref 2.1–9.2)
NEUTROPHILS NFR BLD AUTO: 65 %
NRBC BLD AUTO-RTO: 0 %
PLATELET # BLD AUTO: 166 X10(3)/MCL (ref 130–400)
PMV BLD AUTO: 9.8 FL (ref 9.4–12.4)
POTASSIUM SERPL-SCNC: 4.7 MMOL/L (ref 3.5–5.1)
PROT SERPL-MCNC: 6 GM/DL (ref 5.8–7.6)
PV PEAK VELOCITY: 1.45 CM/S
RBC # BLD AUTO: 4.3 X10(6)/MCL (ref 4.7–6.1)
SODIUM SERPL-SCNC: 139 MMOL/L (ref 136–145)
TDI LATERAL: 0.09 M/S
TDI SEPTAL: 0.09 M/S
TDI: 0.09 M/S
TRICUSPID ANNULAR PLANE SYSTOLIC EXCURSION: 2.44 CM
WBC # SPEC AUTO: 9.1 X10(3)/MCL (ref 4.5–11.5)

## 2022-06-26 PROCEDURE — 80053 COMPREHEN METABOLIC PANEL: CPT | Performed by: PHYSICIAN ASSISTANT

## 2022-06-26 PROCEDURE — 63600175 PHARM REV CODE 636 W HCPCS: Performed by: EMERGENCY MEDICINE

## 2022-06-26 PROCEDURE — 36415 COLL VENOUS BLD VENIPUNCTURE: CPT | Performed by: PHYSICIAN ASSISTANT

## 2022-06-26 PROCEDURE — G0378 HOSPITAL OBSERVATION PER HR: HCPCS

## 2022-06-26 PROCEDURE — 85025 COMPLETE CBC W/AUTO DIFF WBC: CPT | Performed by: PHYSICIAN ASSISTANT

## 2022-06-26 PROCEDURE — 25000003 PHARM REV CODE 250: Performed by: INTERNAL MEDICINE

## 2022-06-26 PROCEDURE — 96361 HYDRATE IV INFUSION ADD-ON: CPT

## 2022-06-26 RX ADMIN — LEVETIRACETAM 1500 MG: 500 TABLET, FILM COATED ORAL at 08:06

## 2022-06-26 RX ADMIN — METOPROLOL TARTRATE 25 MG: 25 TABLET, FILM COATED ORAL at 08:06

## 2022-06-26 RX ADMIN — SODIUM CHLORIDE, POTASSIUM CHLORIDE, SODIUM LACTATE AND CALCIUM CHLORIDE: 600; 310; 30; 20 INJECTION, SOLUTION INTRAVENOUS at 02:06

## 2022-06-26 RX ADMIN — ASPIRIN 81 MG: 81 TABLET, COATED ORAL at 08:06

## 2022-06-26 NOTE — PLAN OF CARE
Problem: Adult Inpatient Plan of Care  Goal: Plan of Care Review  Outcome: Ongoing, Progressing  Flowsheets (Taken 6/26/2022 0730)  Plan of Care Reviewed With:   patient   spouse  Goal: Patient-Specific Goal (Individualized)  Outcome: Ongoing, Progressing  Flowsheets (Taken 6/26/2022 0730)  Anxieties, Fears or Concerns: none  Individualized Care Needs: none  Patient-Specific Goals (Include Timeframe): patient stated he want to go home today  Goal: Absence of Hospital-Acquired Illness or Injury  Outcome: Ongoing, Progressing  Intervention: Identify and Manage Fall Risk  Flowsheets (Taken 6/26/2022 0730)  Safety Promotion/Fall Prevention:   assistive device/personal item within reach   medications reviewed   nonskid shoes/socks when out of bed  Intervention: Prevent Skin Injury  Flowsheets (Taken 6/26/2022 0730)  Body Position: position changed independently  Skin Protection:   adhesive use limited   tubing/devices free from skin contact  Intervention: Prevent and Manage VTE (Venous Thromboembolism) Risk  Flowsheets (Taken 6/26/2022 0730)  Activity Management: Ambulated -L4  VTE Prevention/Management:   remove, assess skin, and reapply sequential compression device   ROM (active) performed  Range of Motion: active ROM (range of motion) encouraged  Goal: Optimal Comfort and Wellbeing  Outcome: Ongoing, Progressing  Intervention: Monitor Pain and Promote Comfort  Flowsheets (Taken 6/26/2022 0730)  Pain Management Interventions: quiet environment facilitated  Intervention: Provide Person-Centered Care  Flowsheets (Taken 6/26/2022 0730)  Trust Relationship/Rapport:   care explained   choices provided   emotional support provided   empathic listening provided   reassurance provided   questions encouraged   questions answered   thoughts/feelings acknowledged  Goal: Readiness for Transition of Care  Outcome: Ongoing, Progressing

## 2022-06-26 NOTE — DISCHARGE SUMMARY
[facility] Medicine Discharge Summary    Admit Date: 6/25/2022  Discharge Date and Time: 6/26/20227:52 AM  Admitting Physician: Brock Rodriguez MD  Discharging Physician: Brock Rodriguez MD.  Primary Care Physician: No primary care provider on file.  Consults: none     Discharge Diagnoses:  Acute hypotension with loss of consciousness  Acute dehydration  CATHI  Hypertension, hyperlipidemia  Known CAD-PCI x3- known to Dr. Mitchell  Macrocytosis  Seizure disorder  BPH    Hospital Course:   Michael Larson is a 66 y.o. male past medical history of hypertension, hyperlipidemia, coronary artery disease status post MI/PCI x3 total 2 years ago, seizure disorder, BPH admitted 06/25/2022 after he was brought into ED by [facility] .  History was provided by the patient and his wife in the room.  Patient reported that his blood pressure has been running normal but this morning he to his blood pressure mention and he and his wife went for fishing trip.  Stated he remained in the shade as well as was hydrating well but around mid day, he started feeling weak.  Developed spots in his vision which grew stronger and then he became flaccid and in able to move and unable to speak to his wife.  EMS was called in and his systolic blood pressure was reported to be in 80s.  Patient received fluid boluses in the ED and his blood pressure responded.  Patient denied any chest pain, shortness of breath.  Patient reported in the last week he was twice cutting the grass on his tractor and has been trying to hydrate himself.    Upon my evaluation in the ED, patient provided me with the whole history, was able to answer all my questions. He does seems a little anxious  Discussed with patient that it looks like that he is severely dehydrated given his creatinine has bumped up to 2.2, which was normal last time.  Discusses CT of the head is negative.  Rest of the labs showed MCV of 101. Patient denied alcohol use states  he drinks only occasionally.  Discussed will order folic acid and B12 levels, mild leukocytosis which could be reactive to dehydration.  His lipid profile is at goal, troponin is less than 0.010  A1c is 5.1, nondiabetic  Influenza a and B and COVID is negative  Urinalysis shows trace ketones and trace leukocyte with no bacteria and nitrite  Carotid ultrasound and echocardiogram are pending reports  Discussed will admit him under observation, will hydrate him, and if renal functions normalized by tomorrow with hydration, possible discharge home.  Answered all their questions to the best of my knowledge in their satisfaction  Admitted to hospitalist service under observation on 06/25/2022  Received 1 L normal saline bolus in the ED, BP responded  Hypotension now resolved  Continue Ringer lactate at 100 cc/hour to recover renal functions  Blood pressure has improved to 117 over 75 while I was in the room  Ordered echocardiograms  Ordered carotid ultrasound  CT of the head without contrast is negative for any acute finding and patient has no neurologic deficit either  I had a long conversation with the patient regarding proper hydration when outside in this heat index.  Discussed use of 64 oz of water 1 in controlled environment but when outdoors, have to replace fluids with Gatorade/Powerade to maintain electrolytes as well  Discussed the color of his urine should be clear/ light yellow and that should be the guuage for proper hydration   I ordered folate and B12 levels as an add on given macrocytosis and no history of alcoholism--> Folate wnl, B12 lower limit of normal   Resumed appropriate home medication for chronic medical conditions, hold lisinopril and placed parameters for metoprolol    Pt was seen and examined on the day of discharge  Vitals:  VITAL SIGNS: 24 HRS MIN & MAX LAST   Temp  Min: 97.6 °F (36.4 °C)  Max: 98.6 °F (37 °C) 97.6 °F (36.4 °C)   BP  Min: 88/56  Max: 147/74 121/78   Pulse  Min: 63  Max: 95   66   Resp  Min: 16  Max: 20 18   SpO2  Min: 94 %  Max: 100 % 96 %       Physical Exam:  Vitals reviewed.  General: In no acute distress, afebrile  Chest: Clear to auscultation bilaterally  Heart: S1, S2, no appreciable murmur  Abdomen: Soft, nontender, BS +  MSK: Warm, no lower extremity edema, no clubbing or cyanosis  Neurologic: Alert and oriented x4, moving all extremities with good strength, no focal neurological deficit       Procedures Performed: No admission procedures for hospital encounter.     Significant Diagnostic Studies: See Full reports for all details    Recent Labs   Lab 06/25/22  1345 06/26/22  0619   WBC 14.2* 9.1   RBC 4.63* 4.30*   HGB 15.2 14.2   HCT 47.0 43.0   .5* 100.0*   MCH 32.8* 33.0*   MCHC 32.3* 33.0   RDW 12.2 12.1    166   MPV 9.6 9.8       Recent Labs   Lab 06/25/22  1345 06/26/22  0554    139   K 4.9 4.7   CO2 20* 24   BUN 24.4 18.6   CREATININE 2.23* 1.25*   CALCIUM 9.7 9.0   ALBUMIN 4.1 3.6   ALKPHOS 71 62   ALT 34 26   AST 22 17   BILITOT 0.8 1.0        Microbiology Results (last 7 days)     ** No results found for the last 168 hours. **           CV Ultrasound Bilateral Doppler Carotid  The right internal carotid artery demonstrated no hemodynamically   significant stenosis.  The left internal carotid artery demonstrated less than 50% stenosis.  Bilateral patent and antegrade vertebral arteries.  CT Head Without Contrast  Narrative: EXAMINATION:  CT HEAD WITHOUT CONTRAST    CLINICAL HISTORY:  syncope;    TECHNIQUE:  Sequential axial images were performed of the brain without contrast.    Dose product length of 911 mGycm. Automated exposure control was utilized to minimize radiation dose.    COMPARISON:  None avail.    FINDINGS:  There is no intracranial mass effect, midline shift, hydrocephalus or hemorrhage. There is no sulcal effacement or low attenuation changes to suggest recent large vessel territory infarction. Chronic appearing periventricular and  subcortical white matter low attenuation changes are consistent with chronic microangiopathic ischemia. The ventricular system and sulcal markings prominence is consistent with atrophy. There is no acute extra axial fluid collection. Visualized paranasal sinuses are clear without mucosal thickening, polypoidal abnormality or air-fluid levels. Mastoid air cells aeration is optimal.  Impression: 1.  No acute intracranial findings identified.    2.  Chronic microangiopathic ischemia and atrophy.    Electronically signed by: Husam Luna  Date:    06/25/2022  Time:    15:24  X-Ray Chest AP Portable  Narrative: EXAMINATION:  XR CHEST AP PORTABLE    CLINICAL HISTORY:  syncope;    COMPARISON:  28 July 2020    FINDINGS:  Portable frontal view of the chest was obtained. Heart is not enlarged.  Low lung volumes.  Suspect some bibasilar atelectasis or scarring.  This is similar to prior.  The lungs are otherwise clear.  No pneumothorax.  Impression: No acute findings.  Stable chest radiograph.    Electronically signed by: Patrick Espino  Date:    06/25/2022  Time:    14:15         Medication List      CONTINUE taking these medications    aspirin 81 MG EC tablet  Commonly known as: ECOTRIN     atorvastatin 20 MG tablet  Commonly known as: LIPITOR     levETIRAcetam 750 MG Tab  Commonly known as: KEPPRA     lisinopriL 5 MG tablet  Commonly known as: PRINIVIL,ZESTRIL     metoprolol tartrate 25 MG tablet  Commonly known as: LOPRESSOR     nitroGLYCERIN 0.4 MG SL tablet  Commonly known as: NITROSTAT     pantoprazole 40 MG tablet  Commonly known as: PROTONIX     tamsulosin 0.4 mg Cap  Commonly known as: FLOMAX     zonisamide 100 MG Cap  Commonly known as: ZONEGRAN             Explained in detail to the patient about the discharge plan, medications, and follow-up visits. Pt understands and agrees with the treatment plan  Discharge Disposition:  Home   Discharged Condition: stable  Diet-   Dietary Orders (From admission, onward)     Start      Ordered    06/25/22 1453  Diet heart healthy  (Diet/Nutrition [facility])  Diet effective now         06/25/22 1452               Medications Per DC med rec  Activities as tolerated   Follow-up Information     Romain Mitchell MD. Schedule an appointment as soon as possible for a visit in 2 week(s).    Specialties: Cardiovascular Disease, Cardiology  Why: post hopsital discharge syncope and collapse due to severe hypotension/ dehydration  Contact information:  [address] [facility]  [facility] [id number] [phone]                       For further questions contact hospitalist office    Discharge time 30 minutes    For worsening symptoms, chest pain, shortness of breath, increased abdominal pain, high grade fever, stroke or stroke like symptoms, immediately go to the nearest Emergency Room or call 911 as soon as possible.      Brock Jo M.D, on 6/26/2022. at 7:52 AM.

## 2022-08-26 ENCOUNTER — HOSPITAL ENCOUNTER (OUTPATIENT)
Dept: RADIOLOGY | Facility: HOSPITAL | Age: 67
Discharge: HOME | End: 2022-08-26
Attending: UROLOGY
Payer: COMMERCIAL

## 2022-08-26 DIAGNOSIS — N20.0 KIDNEY STONE: ICD-10-CM

## 2022-08-26 PROCEDURE — 76775 US EXAM ABDO BACK WALL LIM: CPT | Mod: TC

## 2022-09-09 ENCOUNTER — TELEPHONE (OUTPATIENT)
Dept: NEUROLOGY | Facility: CLINIC | Age: 67
End: 2022-09-09
Payer: MEDICARE

## 2022-09-09 DIAGNOSIS — R56.9 SEIZURE: Primary | ICD-10-CM

## 2022-09-09 RX ORDER — LEVETIRACETAM 750 MG/1
1500 TABLET ORAL 2 TIMES DAILY
Qty: 60 TABLET | Refills: 3 | Status: SHIPPED | OUTPATIENT
Start: 2022-09-09 | End: 2022-09-19

## 2022-09-15 ENCOUNTER — HOSPITAL ENCOUNTER (EMERGENCY)
Facility: HOSPITAL | Age: 67
Discharge: HOME | End: 2022-09-15
Attending: EMERGENCY MEDICINE
Payer: MEDICARE

## 2022-09-15 VITALS
DIASTOLIC BLOOD PRESSURE: 94 MMHG | RESPIRATION RATE: 22 BRPM | BODY MASS INDEX: 29.57 KG/M2 | TEMPERATURE: 97 F | SYSTOLIC BLOOD PRESSURE: 129 MMHG | WEIGHT: 184 LBS | HEIGHT: 66 IN | HEART RATE: 97 BPM | OXYGEN SATURATION: 99 %

## 2022-09-15 DIAGNOSIS — N20.1 URETEROLITHIASIS: Primary | ICD-10-CM

## 2022-09-15 LAB
ALBUMIN SERPL-MCNC: 4.5 GM/DL (ref 3.4–4.8)
ALBUMIN/GLOB SERPL: 1.5 RATIO (ref 1.1–2)
ALP SERPL-CCNC: 72 UNIT/L (ref 40–150)
ALT SERPL-CCNC: 29 UNIT/L (ref 0–55)
APPEARANCE UR: CLEAR
AST SERPL-CCNC: 21 UNIT/L (ref 5–34)
BACTERIA #/AREA URNS AUTO: ABNORMAL /HPF
BASOPHILS # BLD AUTO: 0.05 X10(3)/MCL (ref 0–0.2)
BASOPHILS NFR BLD AUTO: 0.6 %
BILIRUB UR QL STRIP.AUTO: NEGATIVE MG/DL
BILIRUBIN DIRECT+TOT PNL SERPL-MCNC: 0.7 MG/DL
BUN SERPL-MCNC: 19.3 MG/DL (ref 8.4–25.7)
CALCIUM SERPL-MCNC: 9.9 MG/DL (ref 8.8–10)
CHLORIDE SERPL-SCNC: 108 MMOL/L (ref 98–107)
CO2 SERPL-SCNC: 23 MMOL/L (ref 23–31)
COLOR UR AUTO: YELLOW
CREAT SERPL-MCNC: 1.54 MG/DL (ref 0.73–1.18)
EOSINOPHIL # BLD AUTO: 0.11 X10(3)/MCL (ref 0–0.9)
EOSINOPHIL NFR BLD AUTO: 1.2 %
ERYTHROCYTE [DISTWIDTH] IN BLOOD BY AUTOMATED COUNT: 11.6 % (ref 11.5–17)
GFR SERPLBLD CREATININE-BSD FMLA CKD-EPI: 50 MLS/MIN/1.73/M2
GLOBULIN SER-MCNC: 3.1 GM/DL (ref 2.4–3.5)
GLUCOSE SERPL-MCNC: 112 MG/DL (ref 82–115)
GLUCOSE UR QL STRIP.AUTO: NEGATIVE MG/DL
HCT VFR BLD AUTO: 47.5 % (ref 42–52)
HGB BLD-MCNC: 15.7 GM/DL (ref 14–18)
IMM GRANULOCYTES # BLD AUTO: 0.03 X10(3)/MCL (ref 0–0.04)
IMM GRANULOCYTES NFR BLD AUTO: 0.3 %
KETONES UR QL STRIP.AUTO: NEGATIVE MG/DL
LEUKOCYTE ESTERASE UR QL STRIP.AUTO: NEGATIVE UNIT/L
LYMPHOCYTES # BLD AUTO: 1.67 X10(3)/MCL (ref 0.6–4.6)
LYMPHOCYTES NFR BLD AUTO: 18.6 %
MCH RBC QN AUTO: 32.2 PG (ref 27–31)
MCHC RBC AUTO-ENTMCNC: 33.1 MG/DL (ref 33–36)
MCV RBC AUTO: 97.5 FL (ref 80–94)
MONOCYTES # BLD AUTO: 1.14 X10(3)/MCL (ref 0.1–1.3)
MONOCYTES NFR BLD AUTO: 12.7 %
NEUTROPHILS # BLD AUTO: 6 X10(3)/MCL (ref 2.1–9.2)
NEUTROPHILS NFR BLD AUTO: 66.6 %
NITRITE UR QL STRIP.AUTO: NEGATIVE
NRBC BLD AUTO-RTO: 0 %
PH UR STRIP.AUTO: 5.5 [PH]
PLATELET # BLD AUTO: 183 X10(3)/MCL (ref 130–400)
PMV BLD AUTO: 10.3 FL (ref 7.4–10.4)
POTASSIUM SERPL-SCNC: 4.5 MMOL/L (ref 3.5–5.1)
PROT SERPL-MCNC: 7.6 GM/DL (ref 5.8–7.6)
PROT UR QL STRIP.AUTO: ABNORMAL MG/DL
RBC # BLD AUTO: 4.87 X10(6)/MCL (ref 4.7–6.1)
RBC #/AREA URNS AUTO: 32 /HPF
RBC UR QL AUTO: ABNORMAL UNIT/L
SODIUM SERPL-SCNC: 141 MMOL/L (ref 136–145)
SP GR UR STRIP.AUTO: 1.03 (ref 1–1.03)
SQUAMOUS #/AREA URNS AUTO: <5 /HPF
UROBILINOGEN UR STRIP-ACNC: 1 MG/DL
WBC # SPEC AUTO: 9 X10(3)/MCL (ref 4.5–11.5)
WBC #/AREA URNS AUTO: 6 /HPF

## 2022-09-15 PROCEDURE — 96361 HYDRATE IV INFUSION ADD-ON: CPT

## 2022-09-15 PROCEDURE — 36415 COLL VENOUS BLD VENIPUNCTURE: CPT | Performed by: EMERGENCY MEDICINE

## 2022-09-15 PROCEDURE — 25000003 PHARM REV CODE 250: Performed by: EMERGENCY MEDICINE

## 2022-09-15 PROCEDURE — 96374 THER/PROPH/DIAG INJ IV PUSH: CPT

## 2022-09-15 PROCEDURE — 81001 URINALYSIS AUTO W/SCOPE: CPT | Performed by: EMERGENCY MEDICINE

## 2022-09-15 PROCEDURE — 63600175 PHARM REV CODE 636 W HCPCS: Performed by: EMERGENCY MEDICINE

## 2022-09-15 PROCEDURE — 80053 COMPREHEN METABOLIC PANEL: CPT | Performed by: EMERGENCY MEDICINE

## 2022-09-15 PROCEDURE — 85025 COMPLETE CBC W/AUTO DIFF WBC: CPT | Performed by: EMERGENCY MEDICINE

## 2022-09-15 PROCEDURE — 96375 TX/PRO/DX INJ NEW DRUG ADDON: CPT

## 2022-09-15 PROCEDURE — 99285 EMERGENCY DEPT VISIT HI MDM: CPT | Mod: 25

## 2022-09-15 RX ORDER — HYDROMORPHONE HYDROCHLORIDE 2 MG/ML
1 INJECTION, SOLUTION INTRAMUSCULAR; INTRAVENOUS; SUBCUTANEOUS
Status: COMPLETED | OUTPATIENT
Start: 2022-09-15 | End: 2022-09-15

## 2022-09-15 RX ORDER — ONDANSETRON 8 MG/1
8 TABLET, ORALLY DISINTEGRATING ORAL EVERY 6 HOURS PRN
Qty: 16 TABLET | Refills: 0 | Status: SHIPPED | OUTPATIENT
Start: 2022-09-15

## 2022-09-15 RX ORDER — IBUPROFEN 600 MG/1
600 TABLET ORAL EVERY 6 HOURS PRN
Qty: 20 TABLET | Refills: 0 | Status: SHIPPED | OUTPATIENT
Start: 2022-09-15 | End: 2022-09-15

## 2022-09-15 RX ORDER — HYDROMORPHONE HYDROCHLORIDE 2 MG/1
2 TABLET ORAL EVERY 4 HOURS PRN
Qty: 18 TABLET | Refills: 0 | Status: SHIPPED | OUTPATIENT
Start: 2022-09-15

## 2022-09-15 RX ORDER — ONDANSETRON 2 MG/ML
4 INJECTION INTRAMUSCULAR; INTRAVENOUS
Status: COMPLETED | OUTPATIENT
Start: 2022-09-15 | End: 2022-09-15

## 2022-09-15 RX ORDER — KETOROLAC TROMETHAMINE 10 MG/1
10 TABLET, FILM COATED ORAL EVERY 6 HOURS
Qty: 20 TABLET | Refills: 0 | Status: SHIPPED | OUTPATIENT
Start: 2022-09-15 | End: 2022-09-20

## 2022-09-15 RX ORDER — KETOROLAC TROMETHAMINE 30 MG/ML
15 INJECTION, SOLUTION INTRAMUSCULAR; INTRAVENOUS
Status: COMPLETED | OUTPATIENT
Start: 2022-09-15 | End: 2022-09-15

## 2022-09-15 RX ADMIN — HYDROMORPHONE HYDROCHLORIDE 1 MG: 2 INJECTION, SOLUTION INTRAMUSCULAR; INTRAVENOUS; SUBCUTANEOUS at 04:09

## 2022-09-15 RX ADMIN — ONDANSETRON 4 MG: 2 INJECTION INTRAMUSCULAR; INTRAVENOUS at 04:09

## 2022-09-15 RX ADMIN — SODIUM CHLORIDE 1000 ML: 9 INJECTION, SOLUTION INTRAVENOUS at 04:09

## 2022-09-15 RX ADMIN — KETOROLAC TROMETHAMINE 15 MG: 30 INJECTION, SOLUTION INTRAMUSCULAR; INTRAVENOUS at 04:09

## 2022-09-15 NOTE — DISCHARGE INSTRUCTIONS
Please follow up with your primary care physician in 2-3 days.      Please follow up with urology as discussed.    Please return to the emergency department if you develop any worsening symptoms, fever, chest pain, difficulty breathing, or any other new symptoms or concerns.      Thank you for allowing us to take care of you today.

## 2022-09-15 NOTE — ED PROVIDER NOTES
Encounter Date: 9/15/2022       History     Chief Complaint   Patient presents with    Abdominal Pain     Pt states left sided abdominal pain with Hx of kidney stones. Pain started at 0000. Pt denies any dysuria, fever, vomiting, diarrhea, or other recent illnesse.s      66-year-old male with a history of CAD hypertension hyperlipidemia renal stones presents with sudden onset left-sided flank pain started around midnight last night associated with nausea vomiting.  Hurts worse in the left lower quadrant consistent with previous stones.  He has a prescription of Zofran and oral Dilaudid at home to his history of stones.  He took this without relief.  States Toradol morphine have never helped with his stone pain and went allowed years controlled pain.  He is followed by urologist in [place].  Dr. Watkins who is about to retire in transferring his records to [facility].      Abdominal Pain  The current episode started 3 to 5 hours ago. The abdominal pain is located in the LLQ. The severity of the abdominal pain is 10/10. The other symptoms of the illness include nausea and vomiting. The other symptoms of the illness do not include fever, shortness of breath or diarrhea.   Nausea began today.   Review of patient's allergies indicates:  No Known Allergies  Past Medical History:   Diagnosis Date    High cholesterol     Hypertension     Seizures      Past Surgical History:   Procedure Laterality Date    cardiac stents       No family history on file.  Social History     Tobacco Use    Smoking status: Never    Smokeless tobacco: Never   Substance Use Topics    Alcohol use: Yes     Comment: socially    Drug use: Never     Review of Systems   Constitutional:  Negative for fever.   Respiratory:  Negative for cough, chest tightness and shortness of breath.    Cardiovascular:  Negative for chest pain.   Gastrointestinal:  Positive for abdominal pain, nausea and vomiting. Negative for diarrhea.   Musculoskeletal:  Negative for  myalgias and neck pain.   Neurological:  Negative for syncope.   All other systems reviewed and are negative.    Physical Exam     Initial Vitals [09/15/22 0356]   BP Pulse Resp Temp SpO2   138/74 101 (!) 22 97.2 °F (36.2 °C) 99 %      MAP       --         Physical Exam    Nursing note and vitals reviewed.  Constitutional: He appears well-developed and well-nourished. He appears distressed.   Patient kneeling on the floor next to the bed in a crouched position dry heaving   HENT:   Head: Normocephalic and atraumatic.   Eyes: Conjunctivae are normal.   Cardiovascular:  Normal rate.           Pulmonary/Chest: No respiratory distress. He has no wheezes. He has no rhonchi. He exhibits no tenderness.   Abdominal: Abdomen is soft. He exhibits no distension. There is no abdominal tenderness. There is no rebound and no guarding.   Musculoskeletal:         General: Normal range of motion.     Neurological: He is alert and oriented to person, place, and time. He has normal strength.   Skin: Skin is warm and dry.   Psychiatric: He has a normal mood and affect.       ED Course   Procedures  Labs Reviewed   COMPREHENSIVE METABOLIC PANEL - Abnormal; Notable for the following components:       Result Value    Chloride 108 (*)     Creatinine 1.54 (*)     All other components within normal limits   URINALYSIS, REFLEX TO URINE CULTURE - Abnormal; Notable for the following components:    Specific Gravity, UA 1.031 (*)     Protein, UA 1+ (*)     Blood, UA 2+ (*)     All other components within normal limits   CBC WITH DIFFERENTIAL - Abnormal; Notable for the following components:    MCV 97.5 (*)     MCH 32.2 (*)     All other components within normal limits   URINALYSIS, MICROSCOPIC - Abnormal; Notable for the following components:    RBC, UA 32 (*)     WBC, UA 6 (*)     All other components within normal limits   CBC W/ AUTO DIFFERENTIAL    Narrative:     The following orders were created for panel order CBC auto  differential.  Procedure                               Abnormality         Status                     ---------                               -----------         ------                     CBC with Differential[[id number]]        Abnormal            Final result                 Please view results for these tests on the individual orders.          Imaging Results              CT Renal Stone Study ABD Pelvis WO (Final result)  Result time 09/15/22 08:21:06      Final result by Cheryl Muniz MD (09/15/22 08:21:06)                   Impression:      1. Distal left ureteral stone with hydronephrosis  2. Bilateral nephrolithiasis  3. Diverticulosis  4. No significant discrepancy from preliminary report.      Electronically signed by: Cheryl Muniz  Date:    09/15/2022  Time:    08:21               Narrative:    EXAMINATION:  CT RENAL STONE STUDY ABD PELVIS WO    CLINICAL HISTORY:  L flank pain;    TECHNIQUE:  Helically acquired axial images, sagittal and coronal reformations were obtained from the lung bases to the pubic symphysis without the IV administration of contrast.    Automated tube current modulation, weight-based exposure dosing, and/or iterative reconstruction technique utilized to reach lowest reasonably achievable exposure rate.    DLP: 792 mGy*cm    COMPARISON:  No relevant prior available for comparison at the time of dictation.    FINDINGS:  HEART: There are coronary artery calcifications.    LUNG BASES: Mild elevation of the right hemidiaphragm with basilar atelectasis.    LIVER: Normal attenuation. No appreciable focal hepatic lesion.    BILIARY: No calcified gallstones.    PANCREAS: No inflammatory change.    SPLEEN: Normal in size    ADRENALS: No mass.    KIDNEYS/URETERS: There is a 5.2 mm calculus at the distal left ureter with mild to moderate left hydroureteronephrosis and perinephric stranding.  Left renal caliceal calculi measure 2-9 mm.  Incidental 4.5 cm cyst at the lower pole left  kidney appears simple by noncontrast evaluation.  Simple cysts require no imaging follow-up.  Punctate 2-3 mm nonobstructing right renal caliceal calculi.    GI TRACT/MESENTERY: Evaluation of the bowel is limited without contrast.  No evidence of bowel obstruction or inflammation. The appendix is normal. Colonic diverticulosis without acute inflammatory change.    PERITONEUM: No free fluid.No free air.    LYMPH NODES: No enlarged lymph nodes by size criteria.    VASCULATURE: Mild atherosclerosis.    BLADDER: Nondistended bladder limits CT evaluation    REPRODUCTIVE ORGANS: Normal as visualized.    ABDOMINAL WALL: Small fat containing left inguinal hernia. Small fat containing umbilical hernia.    BONES: Lower lumbar facet arthropathy with trace anterolisthesis of L4 on L5.                        Preliminary result by Cheryl Muniz MD (09/15/22 04:43:26)                   Narrative:    START OF REPORT:  Technique: CT of the abdomen and pelvis was performed with axial images as well as sagittal and coronal reconstruction images without intravenous contrast.    Comparison: None available.    Clinical History: Left flank pain.    Dosage Information: Automated Exposure Control was utilized.    Findings:  Lines and Tubes: None.  Thorax:  Lungs: No focal infiltrate or consolidation is seen.  Pleura: No effusions or thickening. No pneumothorax is seen in the visualized lung bases.  Heart: The heart size is within normal limits.  Liver: The liver appears unremarkable.  Biliary System: No intrahepatic or extrahepatic biliary duct dilatation is seen.  Gallbladder: The gallbladder appears unremarkable.  Pancreas: The pancreas appears unremarkable.  Spleen: The spleen appears unremarkable.  Adrenals: The adrenal glands appear unremarkable.  Kidneys: Nonspecific perinephric fat stranding is noted bilaterally. There is mild left-sided hydroureteronephrosis secondary to 5.4 mm stone in the left distal ureter on series 2  image 77. There is associated mild ureteric thickening and periureteric fat stranding. Multiple nonobstructive kidney stones are seen in the left kidney. The left kidney otherwise appears unremarkable with no cysts masses. Multiple nonobstructive kidney stones are seen in the right kidney. The right kidney otherwise appears unremarkable with no cysts masses or hydronephrosis identified.  Aorta: There is mild calcification of the abdominal aorta and its branches.  IVC: Unremarkable.  Bowel:  Esophagus: The visualized esophagus appears unremarkable.  Stomach: The stomach appears unremarkable.  Duodenum: Unremarkable appearing duodenum.  Small Bowel: The small bowel appears unremarkable.  Colon: Multiple diverticula are seen in the sigmoid colon. No associated inflammatory stranding is seen to suggest diverticulitis.  Appendix: The appendix appears unremarkable and is seen on series 2 image 46.  Peritoneum: No intraperitoneal free air or ascites is seen.    Pelvis:  Bladder: The bladder is nondistended but appears otherwise unremarkable.  Male:  Prostate gland: The prostate gland appears unremarkable.  Inguinal Findings:  Inguinal Hernia: Incidental note is made of small uncomplicated mesenteric fat containing bilateral inguinal hernias.    Bony structures:  Dorsal Spine: There is spondylosis of the visualized dorsal spine.  Bony Pelvis: The visualized bony structures of the pelvis appear unremarkable.      Impression:  1. There is mild left-sided hydroureteronephrosis secondary to 5.4 mm stone in the left distal ureter on series 2 image 77. There is associated mild ureteric thickening and periureteric fat stranding.  2. Details and other findings as discussed above.                          Preliminary result by Noah Panda Jr., MD (09/15/22 04:43:26)                   Narrative:    START OF REPORT:  Technique: CT of the abdomen and pelvis was performed with axial images as well as sagittal and coronal  reconstruction images without intravenous contrast.    Comparison: None available.    Clinical History: Left flank pain.    Dosage Information: Automated Exposure Control was utilized.    Findings:  Lines and Tubes: None.  Thorax:  Lungs: No focal infiltrate or consolidation is seen.  Pleura: No effusions or thickening. No pneumothorax is seen in the visualized lung bases.  Heart: The heart size is within normal limits.  Liver: The liver appears unremarkable.  Biliary System: No intrahepatic or extrahepatic biliary duct dilatation is seen.  Gallbladder: The gallbladder appears unremarkable.  Pancreas: The pancreas appears unremarkable.  Spleen: The spleen appears unremarkable.  Adrenals: The adrenal glands appear unremarkable.  Kidneys: Nonspecific perinephric fat stranding is noted bilaterally. There is mild left-sided hydroureteronephrosis secondary to 5.4 mm stone in the left distal ureter on series 2 image 77. There is associated mild ureteric thickening and periureteric fat stranding. Multiple nonobstructive kidney stones are seen in the left kidney. The left kidney otherwise appears unremarkable with no cysts masses. Multiple nonobstructive kidney stones are seen in the right kidney. The right kidney otherwise appears unremarkable with no cysts masses or hydronephrosis identified.  Aorta: There is mild calcification of the abdominal aorta and its branches.  IVC: Unremarkable.  Bowel:  Esophagus: The visualized esophagus appears unremarkable.  Stomach: The stomach appears unremarkable.  Duodenum: Unremarkable appearing duodenum.  Small Bowel: The small bowel appears unremarkable.  Colon: Multiple diverticula are seen in the sigmoid colon. No associated inflammatory stranding is seen to suggest diverticulitis.  Appendix: The appendix appears unremarkable and is seen on series 2 image 46.  Peritoneum: No intraperitoneal free air or ascites is seen.    Pelvis:  Bladder: The bladder is nondistended but appears  otherwise unremarkable.  Male:  Prostate gland: The prostate gland appears unremarkable.  Inguinal Findings:  Inguinal Hernia: Incidental note is made of small uncomplicated mesenteric fat containing bilateral inguinal hernias.    Bony structures:  Dorsal Spine: There is spondylosis of the visualized dorsal spine.  Bony Pelvis: The visualized bony structures of the pelvis appear unremarkable.      Impression:  1. There is mild left-sided hydroureteronephrosis secondary to 5.4 mm stone in the left distal ureter on series 2 image 77. There is associated mild ureteric thickening and periureteric fat stranding.  2. Details and other findings as discussed above.                                         Medications   HYDROmorphone (PF) injection 1 mg (1 mg Intravenous Given 9/15/22 0423)   ketorolac injection 15 mg (15 mg Intravenous Given 9/15/22 0422)   ondansetron injection 4 mg (4 mg Intravenous Given 9/15/22 0422)   sodium chloride 0.9% bolus 1,000 mL (0 mLs Intravenous Stopped 9/15/22 0534)                 ED Course as of 09/15/22 2048   Thu Sep 15, 2022   0541 Pain is resolved.  Patient has an old pill bottle of 2 mg Dilaudid prescribed q.4 hours.  This what he typically takes when he has kidney stone pain.  This bottle was filled in 2020 he still has 9 tablets left that he never used.  He would prefer to get a updated prescription because these medications are old vital states to discarded after 2021.  Or abdomen new prescription of the Dilaudid Zofran.  He states he has plenty of Flomax at home.  Will also add Toradol if his renal function is appropriate   [LF]   0640 Patient signed out to me with plan for following up CMP and UA results. Plan for discharge with PO dilaudid and PO toradol if labs normal and patient pain controlled. Patient reports pain controlled currently. CMP with mild Cr bump to 1.57, though around patient's baseline. UA with no LE or nitrite. Patient stable for discharge with outpatient  urology and PCP follow up. Strict return precautions discussed and patient verbalized understanding. [MC]      ED Course User Index  [LF] Gino Hutchison MD  [MC] French Salomon MD                 Clinical Impression:   Final diagnoses:  [N20.1] Ureterolithiasis (Primary)      ED Disposition Condition    Discharge Stable          ED Prescriptions       Medication Sig Dispense Start Date End Date Auth. Provider    HYDROmorphone (DILAUDID) 2 MG tablet Take 1 tablet (2 mg total) by mouth every 4 (four) hours as needed for Pain. 18 tablet 9/15/2022 -- Gino Hutchison MD    ondansetron (ZOFRAN-ODT) 8 MG TbDL Take 1 tablet (8 mg total) by mouth every 6 (six) hours as needed (nausea). 16 tablet 9/15/2022 -- Gino Hutchison MD    ibuprofen (ADVIL,MOTRIN) 600 MG tablet  (Status: Discontinued) Take 1 tablet (600 mg total) by mouth every 6 (six) hours as needed for Pain. 20 tablet 9/15/2022 9/15/2022 French Salomon MD    ketorolac (TORADOL) 10 mg tablet Take 1 tablet (10 mg total) by mouth every 6 (six) hours. for 5 days 20 tablet 9/15/2022 9/20/2022 French Salomon MD          Follow-up Information       Follow up With Specialties Details Why Contact Info    Roberto Watkins III, MD Urology   [address]  [facility] [address]  [phone]      Miller Arzola MD Urology  or a Urologist of your choice [address]  [address]  [place] [facility] [address]  [phone]      Primary care provider   If your symptoms worsen or change please return to the emergency department for re-evaluation Call your primary care provider to schedule a follow-up appointment within a week    Partha Rios MD Urology  or a Urologist of your choice [address] [place] [address] [address]  [facility] [address]  [phone]               Gino Hutchison MD  09/15/22 1934

## 2023-02-10 ENCOUNTER — OFFICE VISIT (OUTPATIENT)
Dept: NEUROLOGY | Facility: CLINIC | Age: 68
End: 2023-02-10
Payer: MEDICARE

## 2023-02-10 VITALS
HEART RATE: 80 BPM | SYSTOLIC BLOOD PRESSURE: 151 MMHG | DIASTOLIC BLOOD PRESSURE: 106 MMHG | HEIGHT: 66 IN | BODY MASS INDEX: 30.53 KG/M2 | WEIGHT: 190 LBS

## 2023-02-10 DIAGNOSIS — R56.9 SEIZURE: ICD-10-CM

## 2023-02-10 DIAGNOSIS — R56.9 SEIZURES: Primary | ICD-10-CM

## 2023-02-10 PROCEDURE — 99999 PR PBB SHADOW E&M-EST. PATIENT-LVL III: ICD-10-PCS | Mod: PBBFAC,,, | Performed by: NURSE PRACTITIONER

## 2023-02-10 PROCEDURE — 99999 PR PBB SHADOW E&M-EST. PATIENT-LVL III: CPT | Mod: PBBFAC,,, | Performed by: NURSE PRACTITIONER

## 2023-02-10 PROCEDURE — 99213 PR OFFICE/OUTPT VISIT, EST, LEVL III, 20-29 MIN: ICD-10-PCS | Mod: S$PBB,,, | Performed by: NURSE PRACTITIONER

## 2023-02-10 PROCEDURE — 99213 OFFICE O/P EST LOW 20 MIN: CPT | Mod: S$PBB,,, | Performed by: NURSE PRACTITIONER

## 2023-02-10 PROCEDURE — 99213 OFFICE O/P EST LOW 20 MIN: CPT | Mod: PBBFAC | Performed by: NURSE PRACTITIONER

## 2023-02-10 RX ORDER — LEVETIRACETAM 750 MG/1
1500 TABLET ORAL 2 TIMES DAILY
Qty: 360 TABLET | Refills: 3 | Status: SHIPPED | OUTPATIENT
Start: 2023-02-10 | End: 2023-05-11

## 2023-02-10 RX ORDER — AMLODIPINE BESYLATE 5 MG/1
5 TABLET ORAL
COMMUNITY
Start: 2022-11-29

## 2023-02-10 NOTE — PROGRESS NOTES
Subjective:       Patient ID: Michael Larson is a 67 y.o. male.    Chief Complaint:  Seizures (2 year follow up for seizures. Patient states he remains seizure free.)      History of Present Illness  Patient presents for follow up of seizures. LOV 2 years ago. Reports he has remained seizure free over the past 2 years. Currently taking Keppra. States vivid dreams are the only side effect. First seizure occurred unprovoked in 1999 and then had recurrent seizure activity in 2008. Has not had seizure activity since. Has been on Zonegran in the past which caused side effects. Blood pressure is elevated at office visit today. Patient reports he checks his blood pressure and has a daily log. Blood pressures are only ever elevated at medical appointments.        Past Medical History:   Diagnosis Date    High cholesterol     History of heart attack     History of kidney stones     Hypertension     Seizures        Past Surgical History:   Procedure Laterality Date    cardiac stents      LITHOTRIPSY         Family History   Problem Relation Age of Onset    Tuberculosis Mother     Alzheimer's disease Father     Heart attack Father        Social History     Socioeconomic History    Marital status:    Tobacco Use    Smoking status: Never    Smokeless tobacco: Never   Substance and Sexual Activity    Alcohol use: Yes     Alcohol/week: 3.0 standard drinks     Types: 3 Shots of liquor per week     Comment: 1-2 times per week    Drug use: Never    Sexual activity: Yes     Partners: Female       Current Outpatient Medications   Medication Sig Dispense Refill    amLODIPine (NORVASC) 5 MG tablet Take 5 mg by mouth.      aspirin (ECOTRIN) 81 MG EC tablet Take 81 mg by mouth once daily.      atorvastatin (LIPITOR) 20 MG tablet Take 20 mg by mouth every evening.      HYDROmorphone (DILAUDID) 2 MG tablet Take 1 tablet (2 mg total) by mouth every 4 (four) hours as needed for Pain. 18 tablet 0    levETIRAcetam  (KEPPRA) 750 MG Tab Take 2 tablets (1,500 mg total) by mouth 2 (two) times daily. 360 tablet 3    nitroGLYCERIN (NITROSTAT) 0.4 MG SL tablet Place 0.4 mg under the tongue every 5 (five) minutes as needed for Chest pain.      ondansetron (ZOFRAN-ODT) 8 MG TbDL Take 1 tablet (8 mg total) by mouth every 6 (six) hours as needed (nausea). 16 tablet 0    pantoprazole (PROTONIX) 40 MG tablet Take 40 mg by mouth once daily.      tamsulosin (FLOMAX) 0.4 mg Cap Take by mouth once daily.      zonisamide (ZONEGRAN) 100 MG Cap Take 100 mg by mouth nightly.       No current facility-administered medications for this visit.       Review of patient's allergies indicates:  No Known Allergies           Review of Systems  Review of Systems   Neurological:  Negative for seizures.   All other systems reviewed and are negative.    Objective:      Neurologic Exam     Mental Status   Oriented to person, place, and time.   Speech: speech is normal   Level of consciousness: alert    Cranial Nerves   Cranial nerves II through XII intact.     Motor Exam   Muscle bulk: normal    Strength   Strength 5/5 throughout.     Sensory Exam   Light touch normal.     Gait, Coordination, and Reflexes     Gait  Gait: normal    Physical Exam  Vitals and nursing note reviewed.   Pulmonary:      Effort: Pulmonary effort is normal.   Neurological:      Mental Status: He is oriented to person, place, and time.      Cranial Nerves: Cranial nerves 2-12 are intact.      Motor: Motor strength is normal.      Gait: Gait is intact.   Psychiatric:         Speech: Speech normal.         Assessment:        1. Seizures        Plan:   Will follow up in 2 years; ok for refill next year without being seen  Continue Keppra 1500 mg BID

## 2023-12-17 ENCOUNTER — OFFICE VISIT (OUTPATIENT)
Dept: URGENT CARE | Facility: CLINIC | Age: 68
End: 2023-12-17
Payer: MEDICARE

## 2023-12-17 VITALS
SYSTOLIC BLOOD PRESSURE: 133 MMHG | HEART RATE: 121 BPM | RESPIRATION RATE: 17 BRPM | BODY MASS INDEX: 30.53 KG/M2 | HEIGHT: 66 IN | TEMPERATURE: 101 F | OXYGEN SATURATION: 97 % | WEIGHT: 190 LBS | DIASTOLIC BLOOD PRESSURE: 89 MMHG

## 2023-12-17 DIAGNOSIS — J11.1 INFLUENZA: ICD-10-CM

## 2023-12-17 DIAGNOSIS — J02.9 SORE THROAT: Primary | ICD-10-CM

## 2023-12-17 LAB
CTP QC/QA: YES
MOLECULAR STREP A: NEGATIVE
POC MOLECULAR INFLUENZA A AGN: NEGATIVE
POC MOLECULAR INFLUENZA B AGN: NEGATIVE
SARS-COV-2 RDRP RESP QL NAA+PROBE: NEGATIVE

## 2023-12-17 PROCEDURE — 87651 STREP A DNA AMP PROBE: CPT | Mod: QW,,,

## 2023-12-17 PROCEDURE — 87635: ICD-10-PCS | Mod: QW,,,

## 2023-12-17 PROCEDURE — 87502 INFLUENZA DNA AMP PROBE: CPT | Mod: QW,,,

## 2023-12-17 PROCEDURE — 87502 POCT INFLUENZA A/B MOLECULAR: ICD-10-PCS | Mod: QW,,,

## 2023-12-17 PROCEDURE — 87635 SARS-COV-2 COVID-19 AMP PRB: CPT | Mod: QW,,,

## 2023-12-17 PROCEDURE — 87651 POCT STREP A MOLECULAR: ICD-10-PCS | Mod: QW,,,

## 2023-12-17 PROCEDURE — 99214 OFFICE O/P EST MOD 30 MIN: CPT | Mod: ,,,

## 2023-12-17 PROCEDURE — 99214 PR OFFICE/OUTPT VISIT, EST, LEVL IV, 30-39 MIN: ICD-10-PCS | Mod: ,,,

## 2023-12-17 RX ORDER — BALOXAVIR MARBOXIL 80 MG/1
80 TABLET, FILM COATED ORAL ONCE
Qty: 1 TABLET | Refills: 0 | Status: SHIPPED | OUTPATIENT
Start: 2023-12-17 | End: 2023-12-17

## 2023-12-17 RX ORDER — OSELTAMIVIR PHOSPHATE 75 MG/1
75 CAPSULE ORAL 2 TIMES DAILY
Qty: 10 CAPSULE | Refills: 0 | Status: SHIPPED | OUTPATIENT
Start: 2023-12-17 | End: 2023-12-22

## 2023-12-17 RX ORDER — PROMETHAZINE HYDROCHLORIDE AND DEXTROMETHORPHAN HYDROBROMIDE 6.25; 15 MG/5ML; MG/5ML
5 SYRUP ORAL EVERY 6 HOURS PRN
Qty: 100 ML | Refills: 0 | Status: SHIPPED | OUTPATIENT
Start: 2023-12-17 | End: 2023-12-22

## 2023-12-17 RX ORDER — LEVETIRACETAM 750 MG/1
500 TABLET ORAL 2 TIMES DAILY
COMMUNITY
End: 2024-02-01 | Stop reason: SDUPTHER

## 2023-12-17 RX ORDER — ZONISAMIDE 100 MG/1
CAPSULE ORAL
COMMUNITY

## 2023-12-17 NOTE — PATIENT INSTRUCTIONS
We will treat for the flu due to exposure and flu like activity in the community     Medications sent to pharmacy  Xofluza is preferred flu medication to take if covered by insurance; do not take both tamiflu and xofluza together  Caution with the cough medication as it may cause sedation, do not drive or drink alcohol while taking it.   Increase fluid intake. Monitor for fever. Take tylenol/acetaminophen or advil/ibuprofen as needed for headache, bodyaches or fever.   Treat your symptoms like the common cold, take Delysm/dimetapp/robitussin as needed for cough, claritin, flonase, mucinex for congestion, for example.   Complications for flu include pneumonia, bronchitis, and sinusitis.   Stay home for 5 to 7 days total starting from when your symptoms began.  If your symptoms worsen, or you develop shortness of breath, worsening of cough, or fever over 103, seek medical attention immediately.

## 2023-12-17 NOTE — PROGRESS NOTES
"Subjective:      Patient ID: Michael Larson is a 68 y.o. male.    Vitals:  height is 5' 6" (1.676 m) and weight is 86.2 kg (190 lb). His temperature is 100.5 °F (38.1 °C) (abnormal). His blood pressure is 133/89 and his pulse is 121 (abnormal). His respiration is 17 and oxygen saturation is 97%.     Chief Complaint: Cough ( Patient is a 68 y.o. male who presents to urgent care with complaints of wheezing,cough, congestion,sore throat, headache, body aches, nausea, soreness from coughing  x just over 24 hrs . Alleviating factors include OTC medication with mild amount of relief. Patient denies n/v/d. )     Patient is a 68 y.o. male who presents to urgent care with complaints of wheezing,cough, congestion,sore throat, headache, body aches, nausea, soreness from coughing  x just over 24 hrs . Alleviating factors include OTC medication with mild amount of relief. Patient denies n/v/d.     Cough  Associated symptoms include chills, a fever, myalgias, postnasal drip and a sore throat. Pertinent negatives include no shortness of breath or wheezing.     Constitution: Positive for chills, fatigue and fever.   HENT:  Positive for congestion, postnasal drip and sore throat. Negative for trouble swallowing and voice change.    Eyes: Negative.    Respiratory:  Positive for cough and sputum production. Negative for shortness of breath, stridor, wheezing and asthma.    Musculoskeletal:  Positive for muscle ache.   Allergic/Immunologic: Negative for asthma.      Objective:     Physical Exam   Constitutional: He is oriented to person, place, and time. He appears well-developed. He is cooperative.  Non-toxic appearance. He does not appear ill. No distress.   HENT:   Head: Normocephalic and atraumatic.   Ears:   Right Ear: Hearing, tympanic membrane and external ear normal.   Left Ear: Hearing, tympanic membrane and external ear normal.   Nose: Congestion present.   Mouth/Throat: Mucous membranes are normal. Mucous membranes are " moist. Posterior oropharyngeal erythema (clear pnd) present. Oropharynx is clear.   Eyes: Conjunctivae and lids are normal.   Neck: Trachea normal and phonation normal. Neck supple. No edema present. No erythema present. No neck rigidity present.   Cardiovascular: Normal rate and regular rhythm.   Murmur: .msb.  Pulmonary/Chest: Effort normal and breath sounds normal. No stridor. No respiratory distress. He has no decreased breath sounds. He has no wheezes. He has no rhonchi. He has no rales.   Abdominal: Normal appearance.   Neurological: He is alert and oriented to person, place, and time. He exhibits normal muscle tone.   Skin: Skin is warm, intact, not diaphoretic and no rash. Capillary refill takes less than 2 seconds.   Psychiatric: His speech is normal and behavior is normal. Mood normal.   Nursing note and vitals reviewed.         Previous History      Review of patient's allergies indicates:  No Known Allergies    Past Medical History:   Diagnosis Date    High cholesterol     History of heart attack     History of kidney stones     Hypertension     Seizures      Current Outpatient Medications   Medication Instructions    amLODIPine (NORVASC) 5 mg, Oral    aspirin (ECOTRIN) 81 mg, Oral, Daily    atorvastatin (LIPITOR) 20 mg, Oral, Nightly    HYDROmorphone (DILAUDID) 2 mg, Oral, Every 4 hours PRN    levETIRAcetam (KEPPRA) 500 mg, Oral, 2 times daily    nitroGLYCERIN (NITROSTAT) 0.4 mg, Sublingual, Every 5 min PRN    ondansetron (ZOFRAN-ODT) 8 mg, Oral, Every 6 hours PRN    oseltamivir (TAMIFLU) 75 mg, Oral, 2 times daily    pantoprazole (PROTONIX) 40 mg, Oral, Daily    promethazine-dextromethorphan (PROMETHAZINE-DM) 6.25-15 mg/5 mL Syrp 5 mLs, Oral, Every 6 hours PRN    tamsulosin (FLOMAX) 0.4 mg Cap Oral, Daily    XOFLUZA 80 mg, Oral, Once    zonisamide (ZONEGRAN) 100 MG Cap Oral     Past Surgical History:   Procedure Laterality Date    cardiac stents      LITHOTRIPSY       Family History   Problem Relation  "Age of Onset    Tuberculosis Mother     Alzheimer's disease Father     Heart attack Father        Social History     Tobacco Use    Smoking status: Never     Passive exposure: Never    Smokeless tobacco: Never   Substance Use Topics    Alcohol use: Yes     Alcohol/week: 3.0 standard drinks of alcohol     Types: 3 Shots of liquor per week     Comment: 1-2 times per week    Drug use: Never        Physical Exam      Vital Signs Reviewed   /89   Pulse (!) 121   Temp (!) 100.5 °F (38.1 °C)   Resp 17   Ht 5' 6" (1.676 m)   Wt 86.2 kg (190 lb)   SpO2 97%   BMI 30.67 kg/m²        Procedures    Procedures     Labs     Results for orders placed or performed in visit on 12/17/23   POCT Strep A, Molecular   Result Value Ref Range    Molecular Strep A, POC Negative Negative     Acceptable Yes    POCT COVID-19 Rapid Screening   Result Value Ref Range    POC Rapid COVID Negative Negative     Acceptable Yes    POCT Influenza A/B Molecular   Result Value Ref Range    POC Molecular Influenza A Ag Negative Negative, Not Reported    POC Molecular Influenza B Ag Negative Negative, Not Reported     Acceptable Yes        Assessment:     1. Sore throat    2. Influenza        Plan:       Sore throat  -     POCT Strep A, Molecular  -     POCT COVID-19 Rapid Screening  -     POCT Influenza A/B Molecular    Influenza    Other orders  -     oseltamivir (TAMIFLU) 75 MG capsule; Take 1 capsule (75 mg total) by mouth 2 (two) times daily. for 5 days  Dispense: 10 capsule; Refill: 0  -     baloxavir marboxiL (XOFLUZA) 80 mg tablet; Take 1 tablet (80 mg total) by mouth once. for 1 dose  Dispense: 1 tablet; Refill: 0  -     promethazine-dextromethorphan (PROMETHAZINE-DM) 6.25-15 mg/5 mL Syrp; Take 5 mLs by mouth every 6 (six) hours as needed (cough).  Dispense: 100 mL; Refill: 0    Flu, strep and covid negative     We will treat for the flu due to exposure and flu like activity in the community "   Medications sent to pharmacy  Xofluza is preferred flu medication to take if covered by insurance; do not take both tamiflu and xofluza together  Caution with the cough medication as it may cause sedation, do not drive or drink alcohol while taking it.   Increase fluid intake. Monitor for fever. Take tylenol/acetaminophen or advil/ibuprofen as needed for headache, bodyaches or fever.   Treat your symptoms like the common cold, take Delysm/dimetapp/robitussin as needed for cough, claritin, flonase, mucinex for congestion, for example.   Complications for flu include pneumonia, bronchitis, and sinusitis.   Stay home for 5 to 7 days total starting from when your symptoms began.  If your symptoms worsen, or you develop shortness of breath, worsening of cough, or fever over 103, seek medical attention immediately.

## 2024-02-01 ENCOUNTER — TELEPHONE (OUTPATIENT)
Dept: NEUROLOGY | Facility: CLINIC | Age: 69
End: 2024-02-01
Payer: MEDICARE

## 2024-02-01 DIAGNOSIS — R56.9 SEIZURES: Primary | ICD-10-CM

## 2024-02-01 RX ORDER — LEVETIRACETAM 750 MG/1
1500 TABLET ORAL 2 TIMES DAILY
Qty: 360 TABLET | Refills: 11 | Status: SHIPPED | OUTPATIENT
Start: 2024-02-01

## 2024-02-01 NOTE — TELEPHONE ENCOUNTER
Pt came to office to discuss scheduling 2 yr f/u for February 2025.  While scheduling appt pt inquired on receiving rf of Keppra 1500 mg BID. Last appt note (2/10/23) states ok for refill next year without being seen.  Pt requesting for refill to be sent to [facility] in [place].  I was also unsure if pt will be seeing us 1 last time & transferring, or if he should be transferred to Dr. Orlando's carete.

## 2024-10-01 ENCOUNTER — HOSPITAL ENCOUNTER (EMERGENCY)
Facility: HOSPITAL | Age: 69
Discharge: HOME | End: 2024-10-01
Attending: EMERGENCY MEDICINE
Payer: MEDICARE

## 2024-10-01 VITALS
DIASTOLIC BLOOD PRESSURE: 83 MMHG | RESPIRATION RATE: 18 BRPM | OXYGEN SATURATION: 99 % | WEIGHT: 188 LBS | SYSTOLIC BLOOD PRESSURE: 115 MMHG | BODY MASS INDEX: 30.22 KG/M2 | HEART RATE: 60 BPM | HEIGHT: 66 IN | TEMPERATURE: 98 F

## 2024-10-01 DIAGNOSIS — R10.9 RIGHT FLANK PAIN: ICD-10-CM

## 2024-10-01 DIAGNOSIS — N20.1 RIGHT URETERAL STONE: Primary | ICD-10-CM

## 2024-10-01 LAB
ALBUMIN SERPL-MCNC: 4.5 G/DL (ref 3.4–4.8)
ALBUMIN/GLOB SERPL: 1.3 RATIO (ref 1.1–2)
ALP SERPL-CCNC: 74 UNIT/L (ref 40–150)
ALT SERPL-CCNC: 60 UNIT/L (ref 0–55)
ANION GAP SERPL CALC-SCNC: 10 MEQ/L
AST SERPL-CCNC: 32 UNIT/L (ref 5–34)
BACTERIA #/AREA URNS AUTO: ABNORMAL /HPF
BASOPHILS # BLD AUTO: 0.04 X10(3)/MCL
BASOPHILS NFR BLD AUTO: 0.4 %
BILIRUB SERPL-MCNC: 0.8 MG/DL
BILIRUB UR QL STRIP.AUTO: NEGATIVE
BUN SERPL-MCNC: 22.1 MG/DL (ref 8.4–25.7)
CALCIUM SERPL-MCNC: 9.6 MG/DL (ref 8.8–10)
CHLORIDE SERPL-SCNC: 111 MMOL/L (ref 98–107)
CLARITY UR: ABNORMAL
CO2 SERPL-SCNC: 20 MMOL/L (ref 23–31)
COLOR UR AUTO: ABNORMAL
CREAT SERPL-MCNC: 1.56 MG/DL (ref 0.73–1.18)
CREAT/UREA NIT SERPL: 14
EOSINOPHIL # BLD AUTO: 0.06 X10(3)/MCL (ref 0–0.9)
EOSINOPHIL NFR BLD AUTO: 0.6 %
ERYTHROCYTE [DISTWIDTH] IN BLOOD BY AUTOMATED COUNT: 12.3 % (ref 11.5–17)
GFR SERPLBLD CREATININE-BSD FMLA CKD-EPI: 48 ML/MIN/1.73/M2
GLOBULIN SER-MCNC: 3.6 GM/DL (ref 2.4–3.5)
GLUCOSE SERPL-MCNC: 130 MG/DL (ref 82–115)
GLUCOSE UR QL STRIP: NORMAL
HCT VFR BLD AUTO: 49.9 % (ref 42–52)
HGB BLD-MCNC: 16.9 G/DL (ref 14–18)
HGB UR QL STRIP: ABNORMAL
IMM GRANULOCYTES # BLD AUTO: 0.03 X10(3)/MCL (ref 0–0.04)
IMM GRANULOCYTES NFR BLD AUTO: 0.3 %
KETONES UR QL STRIP: NEGATIVE
LEUKOCYTE ESTERASE UR QL STRIP: NEGATIVE
LYMPHOCYTES # BLD AUTO: 2.11 X10(3)/MCL (ref 0.6–4.6)
LYMPHOCYTES NFR BLD AUTO: 20 %
MCH RBC QN AUTO: 32.8 PG (ref 27–31)
MCHC RBC AUTO-ENTMCNC: 33.9 G/DL (ref 33–36)
MCV RBC AUTO: 96.9 FL (ref 80–94)
MONOCYTES # BLD AUTO: 0.82 X10(3)/MCL (ref 0.1–1.3)
MONOCYTES NFR BLD AUTO: 7.8 %
MUCOUS THREADS URNS QL MICRO: ABNORMAL /LPF
NEUTROPHILS # BLD AUTO: 7.51 X10(3)/MCL (ref 2.1–9.2)
NEUTROPHILS NFR BLD AUTO: 70.9 %
NITRITE UR QL STRIP: NEGATIVE
NON-SQ EPI CELLS URNS QL MICRO: ABNORMAL /HPF
NRBC BLD AUTO-RTO: 0 %
PH UR STRIP: 5.5 [PH]
PLATELET # BLD AUTO: 212 X10(3)/MCL (ref 130–400)
PMV BLD AUTO: 9.8 FL (ref 7.4–10.4)
POTASSIUM SERPL-SCNC: 4.6 MMOL/L (ref 3.5–5.1)
PROT SERPL-MCNC: 8.1 GM/DL (ref 5.8–7.6)
PROT UR QL STRIP: ABNORMAL
RBC # BLD AUTO: 5.15 X10(6)/MCL (ref 4.7–6.1)
RBC #/AREA URNS AUTO: >100 /HPF
SODIUM SERPL-SCNC: 141 MMOL/L (ref 136–145)
SP GR UR STRIP.AUTO: 1.04 (ref 1–1.03)
SQUAMOUS #/AREA URNS LPF: ABNORMAL /HPF
UROBILINOGEN UR STRIP-ACNC: NORMAL
WBC # BLD AUTO: 10.57 X10(3)/MCL (ref 4.5–11.5)
WBC #/AREA URNS AUTO: ABNORMAL /HPF
YEAST BUDDING URNS QL: ABNORMAL /HPF

## 2024-10-01 PROCEDURE — 96375 TX/PRO/DX INJ NEW DRUG ADDON: CPT

## 2024-10-01 PROCEDURE — 81001 URINALYSIS AUTO W/SCOPE: CPT | Performed by: EMERGENCY MEDICINE

## 2024-10-01 PROCEDURE — 99285 EMERGENCY DEPT VISIT HI MDM: CPT | Mod: 25

## 2024-10-01 PROCEDURE — 96374 THER/PROPH/DIAG INJ IV PUSH: CPT

## 2024-10-01 PROCEDURE — 63600175 PHARM REV CODE 636 W HCPCS: Performed by: EMERGENCY MEDICINE

## 2024-10-01 PROCEDURE — 80053 COMPREHEN METABOLIC PANEL: CPT | Performed by: EMERGENCY MEDICINE

## 2024-10-01 PROCEDURE — 85025 COMPLETE CBC W/AUTO DIFF WBC: CPT | Performed by: EMERGENCY MEDICINE

## 2024-10-01 RX ORDER — KETOROLAC TROMETHAMINE 10 MG/1
10 TABLET, FILM COATED ORAL EVERY 6 HOURS
Qty: 20 TABLET | Refills: 0 | Status: SHIPPED | OUTPATIENT
Start: 2024-10-01 | End: 2024-10-06

## 2024-10-01 RX ORDER — HYDROCODONE BITARTRATE AND ACETAMINOPHEN 10; 325 MG/1; MG/1
1 TABLET ORAL EVERY 6 HOURS PRN
Qty: 12 TABLET | Refills: 0 | Status: SHIPPED | OUTPATIENT
Start: 2024-10-01

## 2024-10-01 RX ORDER — KETOROLAC TROMETHAMINE 30 MG/ML
15 INJECTION, SOLUTION INTRAMUSCULAR; INTRAVENOUS
Status: COMPLETED | OUTPATIENT
Start: 2024-10-01 | End: 2024-10-01

## 2024-10-01 RX ORDER — ONDANSETRON HYDROCHLORIDE 2 MG/ML
4 INJECTION, SOLUTION INTRAVENOUS
Status: COMPLETED | OUTPATIENT
Start: 2024-10-01 | End: 2024-10-01

## 2024-10-01 RX ADMIN — ONDANSETRON 4 MG: 2 INJECTION INTRAMUSCULAR; INTRAVENOUS at 04:10

## 2024-10-01 RX ADMIN — KETOROLAC TROMETHAMINE 15 MG: 30 INJECTION, SOLUTION INTRAMUSCULAR; INTRAVENOUS at 04:10

## 2024-10-01 NOTE — ED PROVIDER NOTES
Encounter Date: 10/1/2024    SCRIBE #1 NOTE: I, Rhonda Moreno, am scribing for, and in the presence of,  Cate Real DO. I have scribed the following portions of the note - Other sections scribed: HPI, ROS, PE.       History     Chief Complaint   Patient presents with    Flank Pain     Pt c/o R sided flank pain radiating into RLQ abd w/ nausea. Denies vomiting. Hx of kidney stones. Denies dysruia     69 year old male with history of seizures, HTN, CAD, kidney stones, and lithotripsy presents to the ED with complaints of flank pain. Pt reports that the pain began last night. He complains of nausea and hematuria and denies vomiting.     The history is provided by the patient. No  was used.     Review of patient's allergies indicates:  No Known Allergies  Past Medical History:   Diagnosis Date    High cholesterol     History of heart attack     History of kidney stones     Hypertension     Seizures      Past Surgical History:   Procedure Laterality Date    cardiac stents      LITHOTRIPSY       Family History   Problem Relation Name Age of Onset    Tuberculosis Mother      Alzheimer's disease Father      Heart attack Father       Social History     Tobacco Use    Smoking status: Never     Passive exposure: Never    Smokeless tobacco: Never   Substance Use Topics    Alcohol use: Yes     Alcohol/week: 3.0 standard drinks of alcohol     Types: 3 Shots of liquor per week     Comment: 1-2 times per week    Drug use: Never     Review of Systems   Gastrointestinal:  Positive for nausea. Negative for vomiting.   Genitourinary:  Positive for flank pain and hematuria.       Physical Exam     Initial Vitals [10/01/24 0339]   BP Pulse Resp Temp SpO2   130/89 60 18 97.6 °F (36.4 °C) 99 %      MAP       --         Physical Exam    Nursing note and vitals reviewed.  Constitutional: He appears well-developed and well-nourished. He is not diaphoretic. He does not appear ill.   Appears uncomfortable   HENT:    Head: Normocephalic and atraumatic.   Right Ear: External ear normal.   Left Ear: External ear normal.   Nose: Nose normal. Mouth/Throat: Oropharynx is clear and moist.   Eyes: Conjunctivae are normal.   Neck: Neck supple. No tracheal deviation present.   Cardiovascular:  Normal rate, regular rhythm and normal heart sounds.           Pulmonary/Chest: Breath sounds normal. No respiratory distress. He has no wheezes. He has no rhonchi. He has no rales.   Abdominal: Abdomen is soft. He exhibits no distension.   Right CVA tenderness   Musculoskeletal:         General: Normal range of motion.      Cervical back: Neck supple.     Neurological: He is alert and oriented to person, place, and time. He has normal strength. GCS score is 15. GCS eye subscore is 4. GCS verbal subscore is 5. GCS motor subscore is 6.   Skin: Skin is warm and dry. Capillary refill takes less than 2 seconds. No pallor.   Psychiatric: He has a normal mood and affect. His mood appears not anxious.         ED Course   Procedures  Labs Reviewed   COMPREHENSIVE METABOLIC PANEL - Abnormal       Result Value    Sodium 141      Potassium 4.6      Chloride 111 (*)     CO2 20 (*)     Glucose 130 (*)     Blood Urea Nitrogen 22.1      Creatinine 1.56 (*)     Calcium 9.6      Protein Total 8.1 (*)     Albumin 4.5      Globulin 3.6 (*)     Albumin/Globulin Ratio 1.3      Bilirubin Total 0.8      ALP 74      ALT 60 (*)     AST 32      eGFR 48      Anion Gap 10.0      BUN/Creatinine Ratio 14     URINALYSIS, REFLEX TO URINE CULTURE - Abnormal    Color, UA Light-Orange (*)     Appearance, UA Turbid (*)     Specific Gravity, UA 1.036 (*)     pH, UA 5.5      Protein, UA 2+ (*)     Glucose, UA Normal      Ketones, UA Negative      Blood, UA 3+ (*)     Bilirubin, UA Negative      Urobilinogen, UA Normal      Nitrites, UA Negative      Leukocyte Esterase, UA Negative      RBC, UA >100 (*)     WBC, UA None Seen      Bacteria, UA None Seen      Budding Yeast, UA Many (*)      Squamous Epithelial Cells, UA Trace      Mucous, UA Moderate (*)     Non-Squamous Epithelial, UA Trace     CBC WITH DIFFERENTIAL - Abnormal    WBC 10.57      RBC 5.15      Hgb 16.9      Hct 49.9      MCV 96.9 (*)     MCH 32.8 (*)     MCHC 33.9      RDW 12.3      Platelet 212      MPV 9.8      Neut % 70.9      Lymph % 20.0      Mono % 7.8      Eos % 0.6      Basophil % 0.4      Lymph # 2.11      Neut # 7.51      Mono # 0.82      Eos # 0.06      Baso # 0.04      IG# 0.03      IG% 0.3      NRBC% 0.0     CBC W/ AUTO DIFFERENTIAL    Narrative:     The following orders were created for panel order CBC auto differential.  Procedure                               Abnormality         Status                     ---------                               -----------         ------                     CBC with Differential[[id number]]       Abnormal            Final result                 Please view results for these tests on the individual orders.          Imaging Results              CT Renal Stone Study ABD Pelvis WO (Final result)  Result time 10/01/24 06:47:45      Final result by Cheryl Muniz MD (10/01/24 06:47:45)                   Impression:      1. Right ureterovesical junction calculus with mild hydronephrosis.  2. Hepatic steatosis  3. Bilateral nephrolithiasis  4. The preliminary and final reports are concordant.      Electronically signed by: Cheryl Muniz  Date:    10/01/2024  Time:    06:47               Narrative:    EXAMINATION:  CT RENAL STONE STUDY ABD PELVIS WO    CLINICAL HISTORY:  Flank pain, kidney stone suspected;    TECHNIQUE:  Helically acquired axial images, sagittal and coronal reformations were obtained from the lung bases to the pubic symphysis without the IV administration of contrast.    Automated tube current modulation, weight-based exposure dosing, and/or iterative reconstruction technique utilized to reach lowest reasonably achievable exposure rate.    DLP: 735  mGy*cm    COMPARISON:  CT abdomen pelvis 09/15/2022    FINDINGS:  HEART: There is an LAD stent.    LUNG BASES: Elevation the right hemidiaphragm with right basilar atelectasis versus scarring.    LIVER: The liver is hypodense compatible with steatosis.    BILIARY: No calcified gallstones.    PANCREAS: No inflammatory change.    SPLEEN: Normal in size    ADRENALS: No mass.    KIDNEYS/URETERS: There is a 5 mm calculus at the right ureterovesical junction with mild hydronephrosis.  No left hydronephrosis.  Bilateral nephrolithiasis.  There is a 5 mm stone at the left renal pelvis.  Incidental left renal cyst.  No imaging follow-up necessary for incidental cysts.    GI TRACT/MESENTERY: Evaluation of the bowel is limited without contrast.  No evidence of bowel obstruction or inflammation.     Colonic diverticulosis without acute inflammatory change.    PERITONEUM: No free fluid.No free air.    LYMPH NODES: No enlarged lymph nodes by size criteria.    VASCULATURE: Mild aortic atherosclerosis.    BLADDER: Nondistended bladder limits CT evaluation    REPRODUCTIVE ORGANS: Mild prostatomegaly.    ABDOMINAL WALL: Small fat containing umbilical hernia.  Small fat containing left inguinal hernia.    BONES: Degenerative change at the lumbar spine.  Trace anterolisthesis L4 on L5 related to facet arthropathy.                        Preliminary result by Edmundo Glover MD (10/01/24 04:33:18)                   Impression:    1. There is mild right hydronephrosis due to a 4 mm stone in the right ureterovesical junction (series 2, image 147). A 5 mm stone is seen in the proximal left ureter on series 2, image 75 with no significant hydronephrosis. Correlate clinically as regards further evaluation and follow up.  2. Details and other findings as discussed above.               Narrative:    START OF REPORT:  Technique: CT of the abdomen and pelvis was performed with axial images as well as sagittal and coronal reconstruction images  without intravenous contrast or oral contrast.    Comparison: None available.    Clinical History: Rt flank pain.    Dosage Information: Automated Exposure Control was utilized.    Findings:  Lines and Tubes: None.  Thorax:  Lungs: The visualized lung bases appear unremarkable. No focal infiltrate or consolidation is seen.  Pleura: No effusions or thickening.  Heart: The heart size is within normal limits. Coronary artery calcification is seen.  Abdomen:  Abdominal Wall: No abdominal wall pathology is seen.  Liver: Moderate fatty infiltration of the liver is present. The liver otherwise appears unremarkable.  Biliary System: No intrahepatic or extrahepatic biliary duct dilatation is seen.  Gallbladder: The gallbladder appears unremarkable.  Pancreas: The pancreas appears unremarkable.  Spleen: The spleen appears unremarkable.  Adrenals: The adrenal glands appear unremarkable.  Kidneys: Nonspecific perinephric fat stranding is noted bilaterally. Multiple nonobstructive intrarenal collecting system kidney stones are seen in the left kidney. The largest stone measures 1 cm is on Image 62, Series 4 in the lower pole of the left kidney. A single cyst measuring 5 cm is seen on Image 54, Series 4 lower pole of the left kidney. Multiple nonobstructive intrarenal collecting system kidney stones are seen in the right kidney. The largest stone measures 6 mm is on Image 62, Series 4 mid pole of the right kidney. There is mild right hydronephrosis due to a 4 mm stone in the right ureterovesical junction (series 2, image 147). A 5 mm stone is seen in the proximal left ureter on series 2, image 75 with no significant hydronephrosis.  Aorta: There is mild calcification of the abdominal aorta and its branches.  IVC: Unremarkable.  Bowel:  Esophagus: The visualized esophagus appears unremarkable.  Stomach: The stomach appears unremarkable.  Duodenum: Unremarkable appearing duodenum.  Small Bowel: The small bowel appears  unremarkable.  Colon: Nondistended. Numerous diverticula are seen throughout the colon predominantly in the sigmoid colon. No associated inflammatory stranding or pericolonic fluid is seen to suggest diverticulitis.  Appendix: The appendix appears unremarkable and is seen on Image 92, Series 2 through Image 105, Series 2.  Peritoneum: No intraperitoneal free air or ascites is seen.    Pelvis:  Bladder: The bladder is nondistended but appears otherwise unremarkable.  Male:  Prostate gland: The prostate gland is moderately enlarged with its median lobe projecting into the bladder base.    Bony structures:  Dorsal Spine: There is mild multilevel spondylosis of the visualized dorsal spine.  Bony Pelvis: There is subtle degenerative change of the hip.                                         Medications   ketorolac injection 15 mg (15 mg Intravenous Given 10/1/24 0415)   ondansetron injection 4 mg (4 mg Intravenous Given 10/1/24 0415)     Medical Decision Making  Differential diagnosis includes but is not limited to ureteral stone, UTI, pyelonephritis,  diverticulitis, constipation, muscular pain      Creatinine is stable  UA with blood  CT scan with 4mm stone at the right UVJ  Pain improved    Problems Addressed:  Right flank pain: acute illness or injury that poses a threat to life or bodily functions  Right ureteral stone: acute illness or injury that poses a threat to life or bodily functions    Amount and/or Complexity of Data Reviewed  Labs: ordered. Decision-making details documented in ED Course.  Radiology: ordered. Decision-making details documented in ED Course.    Risk  Prescription drug management.              Attending Attestation:           Physician Attestation for Scribe:  Physician Attestation Statement for Scribe #1: I, Cate Real, DO, reviewed documentation, as scribed by Rhonda Moreno in my presence, and it is both accurate and complete.             ED Course as of 10/01/24 0759   Tue Oct 01,  2024   0533 Pain has improved. Discussed plan with patient. He has Zofran and Flomax at home. Will write for Toradol and Norco. Discussed cautions with both. His wife already called Dr Hercules this morning for follow up  [KM]      ED Course User Index  [KM] Cate Real MD                           Clinical Impression:  Final diagnoses:  [R10.9] Right flank pain  [N20.1] Right ureteral stone (Primary)          ED Disposition Condition    Discharge Stable          ED Prescriptions       Medication Sig Dispense Start Date End Date Auth. Provider    ketorolac (TORADOL) 10 mg tablet Take 1 tablet (10 mg total) by mouth every 6 (six) hours. for 5 days 20 tablet 10/1/2024 10/6/2024 Cate Real MD    HYDROcodone-acetaminophen (NORCO)  mg per tablet Take 1 tablet by mouth every 6 (six) hours as needed for Pain. 12 tablet 10/1/2024 -- Cate Real MD          Follow-up Information       Follow up With Specialties Details Why Contact Info    [facility] - Emergency Dept Emergency Medicine  As needed [address] [facility] [id number] [phone]    Seng Hercules MD Urology Call   [address]  [facility] [address]  [phone]      [facility] - Emergency Dept Emergency Medicine   [address] [facility] [id number] [phone]             Cate Real MD  10/01/24 0752

## 2024-10-01 NOTE — ED TRIAGE NOTES
Pt c/o R sided flank pain radiating into RLQ abd w/ nausea. Denies vomiting. Hx of kidney stones. Denies dysruia

## 2024-11-24 ENCOUNTER — OFFICE VISIT (OUTPATIENT)
Dept: URGENT CARE | Facility: CLINIC | Age: 69
End: 2024-11-24
Payer: MEDICARE

## 2024-11-24 VITALS
WEIGHT: 187 LBS | TEMPERATURE: 97 F | DIASTOLIC BLOOD PRESSURE: 104 MMHG | HEIGHT: 66 IN | HEART RATE: 83 BPM | RESPIRATION RATE: 18 BRPM | OXYGEN SATURATION: 97 % | SYSTOLIC BLOOD PRESSURE: 155 MMHG | BODY MASS INDEX: 30.05 KG/M2

## 2024-11-24 DIAGNOSIS — H69.91 DYSFUNCTION OF RIGHT EUSTACHIAN TUBE: Primary | ICD-10-CM

## 2024-11-24 PROCEDURE — 99213 OFFICE O/P EST LOW 20 MIN: CPT | Mod: 25,,, | Performed by: PHYSICIAN ASSISTANT

## 2024-11-24 PROCEDURE — 96372 THER/PROPH/DIAG INJ SC/IM: CPT | Mod: ,,, | Performed by: PHYSICIAN ASSISTANT

## 2024-11-24 RX ORDER — BETAMETHASONE SODIUM PHOSPHATE AND BETAMETHASONE ACETATE 3; 3 MG/ML; MG/ML
9 INJECTION, SUSPENSION INTRA-ARTICULAR; INTRALESIONAL; INTRAMUSCULAR; SOFT TISSUE
Status: COMPLETED | OUTPATIENT
Start: 2024-11-24 | End: 2024-11-24

## 2024-11-24 RX ADMIN — BETAMETHASONE SODIUM PHOSPHATE AND BETAMETHASONE ACETATE 9 MG: 3; 3 INJECTION, SUSPENSION INTRA-ARTICULAR; INTRALESIONAL; INTRAMUSCULAR; SOFT TISSUE at 10:11

## 2024-11-24 NOTE — PATIENT INSTRUCTIONS
Drink plenty of fluids.     Get plenty of rest.     Zyrtec and Flonase as directed.     Contact our office on Wednesday if symptoms persist or worsen.     Go to the ER with any significant change or worsening of symptoms.     Follow up with your primary care doctor.

## 2024-11-24 NOTE — PROGRESS NOTES
"Subjective:      Patient ID: Michael Larson is a 69 y.o. male.    Vitals:  height is 5' 6" (1.676 m) and weight is 84.8 kg (187 lb). His temperature is 96.9 °F (36.1 °C). His blood pressure is 155/104 (abnormal) and his pulse is 83. His respiration is 18 and oxygen saturation is 97%.     Chief Complaint: Otalgia     Patient is a 69 y.o. male who presents to urgent care with complaints of right earache x1 wk . Patient did have cough about 2 wks ago then ear started hurting him.    Otalgia       HENT:  Positive for ear pain.       Objective:     Physical Exam   Constitutional: He is oriented to person, place, and time. He appears well-developed. He is cooperative.  Non-toxic appearance. He does not appear ill. No distress.   HENT:   Head: Normocephalic and atraumatic.   Ears:   Right Ear: Hearing, external ear and ear canal normal.   Left Ear: Hearing, tympanic membrane, external ear and ear canal normal.   Nose: Nose normal. No nasal deformity. No epistaxis.   Mouth/Throat: Uvula is midline, oropharynx is clear and moist and mucous membranes are normal. No trismus in the jaw. Normal dentition. No uvula swelling. No oropharyngeal exudate, posterior oropharyngeal edema or posterior oropharyngeal erythema.   Eyes: Conjunctivae and lids are normal. No scleral icterus.   Neck: Trachea normal and phonation normal. Neck supple. No edema present. No erythema present. No neck rigidity present.   Cardiovascular: Normal rate, regular rhythm, normal heart sounds and normal pulses.   Pulmonary/Chest: Effort normal and breath sounds normal. No respiratory distress. He has no decreased breath sounds. He has no rhonchi.   Abdominal: Normal appearance.   Musculoskeletal: Normal range of motion.         General: No deformity. Normal range of motion.   Neurological: He is alert and oriented to person, place, and time. He exhibits normal muscle tone. Coordination normal.   Skin: Skin is warm, dry, intact, not diaphoretic and not pale. " "  Psychiatric: His speech is normal and behavior is normal. Judgment and thought content normal.   Nursing note and vitals reviewed.    Right TM is retracted.  No appreciated erythema or purulent effusion.         Previous History      Review of patient's allergies indicates:  No Known Allergies    Past Medical History:   Diagnosis Date    High cholesterol     History of heart attack     History of kidney stones     Hypertension     Seizures      Current Outpatient Medications   Medication Instructions    amLODIPine (NORVASC) 5 mg    aspirin (ECOTRIN) 81 mg, Daily    atorvastatin (LIPITOR) 20 mg, Nightly    HYDROcodone-acetaminophen (NORCO)  mg per tablet 1 tablet, Oral, Every 6 hours PRN    HYDROmorphone (DILAUDID) 2 mg, Oral, Every 4 hours PRN    levETIRAcetam (KEPPRA) 1,500 mg, Oral, 2 times daily    nitroGLYCERIN (NITROSTAT) 0.4 mg, Every 5 min PRN    ondansetron (ZOFRAN-ODT) 8 mg, Oral, Every 6 hours PRN    pantoprazole (PROTONIX) 40 mg, Daily    tamsulosin (FLOMAX) 0.4 mg Cap Daily    zonisamide (ZONEGRAN) 100 MG Cap Take by mouth.     Past Surgical History:   Procedure Laterality Date    cardiac stents      LITHOTRIPSY       Family History   Problem Relation Name Age of Onset    Tuberculosis Mother      Alzheimer's disease Father      Heart attack Father         Social History     Tobacco Use    Smoking status: Never     Passive exposure: Never    Smokeless tobacco: Never   Substance Use Topics    Alcohol use: Yes     Alcohol/week: 3.0 standard drinks of alcohol     Types: 3 Shots of liquor per week     Comment: 1-2 times per week    Drug use: Never        Physical Exam      Vital Signs Reviewed   BP (!) 155/104   Pulse 83   Temp 96.9 °F (36.1 °C)   Resp 18   Ht 5' 6" (1.676 m)   Wt 84.8 kg (187 lb)   SpO2 97%   BMI 30.18 kg/m²        Procedures    Procedures     Labs     Results for orders placed or performed during the hospital encounter of 10/01/24   Urinalysis, Reflex to Urine Culture    " Collection Time: 10/01/24  3:49 AM    Specimen: Urine   Result Value Ref Range    Color, UA Light-Orange (A) Yellow, Light-Yellow, Colorless, Straw, Dark-Yellow    Appearance, UA Turbid (A) Clear    Specific Gravity, UA 1.036 (H) 1.005 - 1.030    pH, UA 5.5 5.0 - 8.5    Protein, UA 2+ (A) Negative    Glucose, UA Normal Negative, Normal    Ketones, UA Negative Negative    Blood, UA 3+ (A) Negative    Bilirubin, UA Negative Negative    Urobilinogen, UA Normal 0.2, 1.0, Normal    Nitrites, UA Negative Negative    Leukocyte Esterase, UA Negative Negative    RBC, UA >100 (A) None Seen, 0-2, 3-5, 0-5 /HPF    WBC, UA None Seen None Seen, 0-2, 3-5, 0-5 /HPF    Bacteria, UA None Seen None Seen, Trace /HPF    Budding Yeast, UA Many (A) None Seen /HPF    Squamous Epithelial Cells, UA Trace None Seen, Trace, Rare /HPF    Mucous, UA Moderate (A) None Seen /LPF    Non-Squamous Epithelial, UA Trace /HPF   CBC with Differential    Collection Time: 10/01/24  4:05 AM   Result Value Ref Range    WBC 10.57 4.50 - 11.50 x10(3)/mcL    RBC 5.15 4.70 - 6.10 x10(6)/mcL    Hgb 16.9 14.0 - 18.0 g/dL    Hct 49.9 42.0 - 52.0 %    MCV 96.9 (H) 80.0 - 94.0 fL    MCH 32.8 (H) 27.0 - 31.0 pg    MCHC 33.9 33.0 - 36.0 g/dL    RDW 12.3 11.5 - 17.0 %    Platelet 212 130 - 400 x10(3)/mcL    MPV 9.8 7.4 - 10.4 fL    Neut % 70.9 %    Lymph % 20.0 %    Mono % 7.8 %    Eos % 0.6 %    Basophil % 0.4 %    Lymph # 2.11 0.6 - 4.6 x10(3)/mcL    Neut # 7.51 2.1 - 9.2 x10(3)/mcL    Mono # 0.82 0.1 - 1.3 x10(3)/mcL    Eos # 0.06 0 - 0.9 x10(3)/mcL    Baso # 0.04 <=0.2 x10(3)/mcL    IG# 0.03 0 - 0.04 x10(3)/mcL    IG% 0.3 %    NRBC% 0.0 %   Comprehensive metabolic panel    Collection Time: 10/01/24  4:40 AM   Result Value Ref Range    Sodium 141 136 - 145 mmol/L    Potassium 4.6 3.5 - 5.1 mmol/L    Chloride 111 (H) 98 - 107 mmol/L    CO2 20 (L) 23 - 31 mmol/L    Glucose 130 (H) 82 - 115 mg/dL    Blood Urea Nitrogen 22.1 8.4 - 25.7 mg/dL    Creatinine 1.56 (H) 0.73 -  1.18 mg/dL    Calcium 9.6 8.8 - 10.0 mg/dL    Protein Total 8.1 (H) 5.8 - 7.6 gm/dL    Albumin 4.5 3.4 - 4.8 g/dL    Globulin 3.6 (H) 2.4 - 3.5 gm/dL    Albumin/Globulin Ratio 1.3 1.1 - 2.0 ratio    Bilirubin Total 0.8 <=1.5 mg/dL    ALP 74 40 - 150 unit/L    ALT 60 (H) 0 - 55 unit/L    AST 32 5 - 34 unit/L    eGFR 48 mL/min/1.73/m2    Anion Gap 10.0 mEq/L    BUN/Creatinine Ratio 14      Assessment:     1. Dysfunction of right eustachian tube        Plan:       Dysfunction of right eustachian tube    Other orders  -     betamethasone acetate-betamethasone sodium phosphate injection 9 mg      Drink plenty of fluids.     Get plenty of rest.     Zyrtec and Flonase as directed.     Contact our office on Wednesday if symptoms persist or worsen.     Go to the ER with any significant change or worsening of symptoms.     Follow up with your primary care doctor.

## 2024-11-27 ENCOUNTER — TELEPHONE (OUTPATIENT)
Dept: URGENT CARE | Facility: CLINIC | Age: 69
End: 2024-11-27
Payer: MEDICARE

## 2024-11-27 RX ORDER — AMOXICILLIN AND CLAVULANATE POTASSIUM 875; 125 MG/1; MG/1
1 TABLET, FILM COATED ORAL EVERY 12 HOURS
Qty: 20 TABLET | Refills: 0 | Status: SHIPPED | OUTPATIENT
Start: 2024-11-27 | End: 2024-12-07

## 2024-11-27 NOTE — TELEPHONE ENCOUNTER
Due to this patient's symptoms persisting, I will send him in a prescription for Augmentin to cover for a bacterial sinusitis.  Follow up as needed.

## 2024-12-03 ENCOUNTER — OFFICE VISIT (OUTPATIENT)
Dept: URGENT CARE | Facility: CLINIC | Age: 69
End: 2024-12-03
Payer: MEDICARE

## 2024-12-03 VITALS
SYSTOLIC BLOOD PRESSURE: 134 MMHG | DIASTOLIC BLOOD PRESSURE: 87 MMHG | WEIGHT: 187 LBS | HEIGHT: 66 IN | OXYGEN SATURATION: 98 % | HEART RATE: 76 BPM | BODY MASS INDEX: 30.05 KG/M2 | TEMPERATURE: 98 F | RESPIRATION RATE: 20 BRPM

## 2024-12-03 DIAGNOSIS — M79.18 MUSCULOSKELETAL PAIN: ICD-10-CM

## 2024-12-03 DIAGNOSIS — S39.012A STRAIN OF LUMBAR REGION, INITIAL ENCOUNTER: Primary | ICD-10-CM

## 2024-12-03 RX ORDER — KETOROLAC TROMETHAMINE 30 MG/ML
30 INJECTION, SOLUTION INTRAMUSCULAR; INTRAVENOUS
Status: COMPLETED | OUTPATIENT
Start: 2024-12-03 | End: 2024-12-03

## 2024-12-03 RX ORDER — MELOXICAM 15 MG/1
15 TABLET ORAL DAILY
Qty: 15 TABLET | Refills: 0 | Status: SHIPPED | OUTPATIENT
Start: 2024-12-03 | End: 2024-12-18

## 2024-12-03 RX ORDER — METAXALONE 800 MG/1
800 TABLET ORAL 3 TIMES DAILY
Qty: 30 TABLET | Refills: 0 | Status: SHIPPED | OUTPATIENT
Start: 2024-12-03 | End: 2024-12-13

## 2024-12-03 RX ORDER — DEXAMETHASONE SODIUM PHOSPHATE 10 MG/ML
10 INJECTION INTRAMUSCULAR; INTRAVENOUS
Status: COMPLETED | OUTPATIENT
Start: 2024-12-03 | End: 2024-12-03

## 2024-12-03 RX ORDER — HYDROCODONE BITARTRATE AND ACETAMINOPHEN 7.5; 325 MG/1; MG/1
1 TABLET ORAL EVERY 6 HOURS PRN
Qty: 12 TABLET | Refills: 0 | Status: SHIPPED | OUTPATIENT
Start: 2024-12-03 | End: 2024-12-06

## 2024-12-03 RX ADMIN — KETOROLAC TROMETHAMINE 30 MG: 30 INJECTION, SOLUTION INTRAMUSCULAR; INTRAVENOUS at 11:12

## 2024-12-03 RX ADMIN — DEXAMETHASONE SODIUM PHOSPHATE 10 MG: 10 INJECTION INTRAMUSCULAR; INTRAVENOUS at 11:12

## 2024-12-03 NOTE — PATIENT INSTRUCTIONS
Take medications only as prescribed as they may cause sedation (safety precautions given).   Drink plenty of fluids and get plenty of rest.  Take medication with food.   Lower back stretches daily.  Avoid strenuous lifting, use proper body mechanics.  Apply heating pad, Ice pack, Biofreeze or Epsom salt baths as needed.  Encourage exercise to strengthen core muscles to support your back.  Follow-up with your primary care doctor as needed.   Present to the Emergency Department with any significant change or worsening symptoms.

## 2024-12-03 NOTE — PROGRESS NOTES
"Subjective:      Patient ID: Michael Larson is a 69 y.o. male.    Vitals:  height is 5' 6" (1.676 m) and weight is 84.8 kg (187 lb). His oral temperature is 97.9 °F (36.6 °C). His blood pressure is 134/87 and his pulse is 76. His respiration is 20 and oxygen saturation is 98%.     Chief Complaint: Back Pain     Patient is a 69 y.o. male who presents to urgent care with complaints of back pain x4 days. Pt states he bent over to  a lawn chair and ever since then he has been in pain. Alleviating factors include Norco, muscle relaxer (cyclobenzaprine 5mg 1tab q6) with mild amount of relief.  Unsteady gait due to pain/spasm.    Patient denies any saddle anesthesia, bowel incontinence, or bladder incontinence.  Patient rates pain 9/10 upon movement only.  Patient states much less pain when sitting still.    Back Pain      Constitution: Negative.   HENT: Negative.     Neck: neck negative.   Cardiovascular: Negative.    Eyes: Negative.    Respiratory: Negative.     Gastrointestinal: Negative.    Endocrine: negative.   Genitourinary: Negative.    Musculoskeletal:  Positive for pain, back pain and pain with walking.   Skin: Negative.    Allergic/Immunologic: Negative.    Neurological: Negative.    Hematologic/Lymphatic: Negative.    Psychiatric/Behavioral: Negative.        Objective:     Physical Exam   Constitutional: He is oriented to person, place, and time. He appears well-developed. He is cooperative.     obesity  HENT:   Head: Normocephalic and atraumatic.   Ears:   Right Ear: Hearing, tympanic membrane, external ear and ear canal normal.   Left Ear: Hearing, tympanic membrane, external ear and ear canal normal.   Nose: Nose normal. No mucosal edema or nasal deformity. No epistaxis. Right sinus exhibits no maxillary sinus tenderness and no frontal sinus tenderness. Left sinus exhibits no maxillary sinus tenderness and no frontal sinus tenderness.   Mouth/Throat: Uvula is midline, oropharynx is clear and moist " and mucous membranes are normal. No trismus in the jaw. Normal dentition. No uvula swelling.   Eyes: Conjunctivae and lids are normal.   Neck: Trachea normal and phonation normal. Neck supple.   Cardiovascular: Normal rate, regular rhythm, normal heart sounds and normal pulses.   Pulmonary/Chest: Effort normal and breath sounds normal.   Abdominal: Normal appearance and bowel sounds are normal. Soft.   Musculoskeletal:      Thoracic back: Normal.      Lumbar back: He exhibits decreased range of motion, tenderness and spasm.        Back:    Neurological: He is alert and oriented to person, place, and time. He has normal motor skills and normal sensation. He exhibits normal muscle tone. Coordination normal. GCS eye subscore is 4. GCS verbal subscore is 5. GCS motor subscore is 6.   Skin: Skin is warm, dry and intact.   Psychiatric: His speech is normal and behavior is normal. Judgment and thought content normal.   Nursing note and vitals reviewed.      Assessment:     1. Strain of lumbar region, initial encounter    2. Musculoskeletal pain        Plan:   Take medications only as prescribed as they may cause sedation (safety precautions given).   Drink plenty of fluids and get plenty of rest.  Take medication with food.   Lower back stretches daily.  Avoid strenuous lifting, use proper body mechanics.  Apply heating pad, Ice pack, Biofreeze or Epsom salt baths as needed.  Encourage exercise to strengthen core muscles to support your back.  Follow-up with your primary care doctor as needed.   Present to the Emergency Department with any significant change or worsening symptoms.     Strain of lumbar region, initial encounter  -     ketorolac injection 30 mg  -     dexAMETHasone injection 10 mg  -     metaxalone (SKELAXIN) 800 MG tablet; Take 1 tablet (800 mg total) by mouth 3 (three) times daily. for 10 days  Dispense: 30 tablet; Refill: 0  -     HYDROcodone-acetaminophen (NORCO) 7.5-325 mg per tablet; Take 1 tablet by  mouth every 6 (six) hours as needed for Pain.  Dispense: 12 tablet; Refill: 0  -     meloxicam (MOBIC) 15 MG tablet; Take 1 tablet (15 mg total) by mouth once daily. for 15 days  Dispense: 15 tablet; Refill: 0    Musculoskeletal pain  -     ketorolac injection 30 mg  -     dexAMETHasone injection 10 mg

## 2024-12-13 ENCOUNTER — OFFICE VISIT (OUTPATIENT)
Dept: URGENT CARE | Facility: CLINIC | Age: 69
End: 2024-12-13
Payer: MEDICARE

## 2024-12-13 VITALS
TEMPERATURE: 98 F | HEIGHT: 66 IN | DIASTOLIC BLOOD PRESSURE: 85 MMHG | SYSTOLIC BLOOD PRESSURE: 132 MMHG | WEIGHT: 187 LBS | BODY MASS INDEX: 30.05 KG/M2 | HEART RATE: 100 BPM | RESPIRATION RATE: 18 BRPM | OXYGEN SATURATION: 98 %

## 2024-12-13 DIAGNOSIS — H92.09 OTALGIA, UNSPECIFIED LATERALITY: Primary | ICD-10-CM

## 2024-12-13 DIAGNOSIS — M54.50 ACUTE MIDLINE LOW BACK PAIN WITHOUT SCIATICA: ICD-10-CM

## 2024-12-13 DIAGNOSIS — H69.90 DYSFUNCTION OF EUSTACHIAN TUBE, UNSPECIFIED LATERALITY: ICD-10-CM

## 2024-12-13 RX ORDER — PREDNISONE 10 MG/1
30 TABLET ORAL DAILY
Qty: 15 TABLET | Refills: 0 | Status: SHIPPED | OUTPATIENT
Start: 2024-12-13 | End: 2024-12-18

## 2024-12-13 RX ORDER — METAXALONE 800 MG/1
800 TABLET ORAL 3 TIMES DAILY
Qty: 30 TABLET | Refills: 0 | Status: SHIPPED | OUTPATIENT
Start: 2024-12-13 | End: 2024-12-23

## 2024-12-13 RX ORDER — MELOXICAM 15 MG/1
15 TABLET ORAL DAILY
Qty: 14 TABLET | Refills: 0 | Status: SHIPPED | OUTPATIENT
Start: 2024-12-13 | End: 2024-12-27

## 2024-12-13 NOTE — PATIENT INSTRUCTIONS
Plan:   Medications sent to pharmacy  Continue Zyrtec and Flonase daily  You have been referred to ENT for further evaluation of your ear, they will call you for an appointment  Muscle relaxer and anti-inflammatory have been refilled.  Not drink alcohol or drive when taking the muscle relaxer.  Do not take any Advil Alleve Motrin ibuprofen naproxen with the anti-inflammatory.  You are being referred to physical therapy.  They will call you for an appointment.  Avoid heavy lifting or strenuous activities for the time being.  If your symptoms worsen seek medical attention immediately

## 2024-12-13 NOTE — PROGRESS NOTES
"Subjective:      Patient ID: Michael Larson is a 69 y.o. male.    Vitals:  height is 5' 6" (1.676 m) and weight is 84.8 kg (187 lb). His temperature is 98.2 °F (36.8 °C). His blood pressure is 132/85 and his pulse is 100. His respiration is 18 and oxygen saturation is 98%.     Chief Complaint: Back Pain     Patient is a 69 y.o. male who presents to urgent care with complaints of back pain x11d, EA(right) x3w. Alleviating factors include meloxicam, meaxalone, NORCO, amoxicillin with mild amount of relief.   Last visit(12/03/2024) Back pain - meloxicam & metaxalone (both not completed) NORCO (completed)  Ketorolac 30 mg  Dexamethasone 10 mg    Last visit(11/24/2024) EA - amoxicillin (completed)    Patient does not have a family doctor.  States he does see cardiology and urology for other issues  Denies any bladder or bowel dysfunction.  Denies any weakness or numbness in the legs.  Has been taking Zyrtec and Flonase for his ear pressure but not resolving.  Did call for antibiotics.  Finish the antibiotics.  Still has the ear issues.        Constitution: Negative.   HENT:  Positive for ear pain.    Neck: neck negative.   Cardiovascular: Negative.    Eyes: Negative.    Respiratory: Negative.     Gastrointestinal: Negative.    Genitourinary: Negative.    Musculoskeletal:  Positive for back pain.   Skin: Negative.    Allergic/Immunologic: Negative.    Neurological: Negative.    Hematologic/Lymphatic: Negative.       Objective:     Physical Exam   Constitutional: He is oriented to person, place, and time. He appears well-developed. He is cooperative.  Non-toxic appearance. He does not appear ill. No distress.   HENT:   Head: Normocephalic and atraumatic.   Ears:   Right Ear: Hearing and external ear normal.   Left Ear: Hearing and external ear normal.   Mouth/Throat: Mucous membranes are normal.   Eyes: Conjunctivae and lids are normal.   Neck: Trachea normal and phonation normal. Neck supple. No edema present. No " erythema present. No neck rigidity present.   Cardiovascular: Normal rate, regular rhythm and normal heart sounds.   Pulmonary/Chest: Effort normal. He has no decreased breath sounds.   Abdominal: Normal appearance.   Neurological: He is alert and oriented to person, place, and time. He exhibits normal muscle tone.   Skin: Skin is warm, intact and not diaphoretic.   Psychiatric: His speech is normal and behavior is normal. Mood, judgment and thought content normal.   Nursing note and vitals reviewed.         Previous History      Review of patient's allergies indicates:  No Known Allergies    Past Medical History:   Diagnosis Date    High cholesterol     History of heart attack     History of kidney stones     Hypertension     Seizures      Current Outpatient Medications   Medication Instructions    amLODIPine (NORVASC) 5 mg    aspirin (ECOTRIN) 81 mg, Daily    atorvastatin (LIPITOR) 20 mg, Nightly    HYDROcodone-acetaminophen (NORCO)  mg per tablet 1 tablet, Oral, Every 6 hours PRN    HYDROmorphone (DILAUDID) 2 mg, Oral, Every 4 hours PRN    levETIRAcetam (KEPPRA) 1,500 mg, Oral, 2 times daily    meloxicam (MOBIC) 15 mg, Oral, Daily    meloxicam (MOBIC) 15 mg, Oral, Daily    metaxalone (SKELAXIN) 800 mg, Oral, 3 times daily    metaxalone (SKELAXIN) 800 mg, Oral, 3 times daily    nitroGLYCERIN (NITROSTAT) 0.4 mg, Every 5 min PRN    ondansetron (ZOFRAN-ODT) 8 mg, Oral, Every 6 hours PRN    pantoprazole (PROTONIX) 40 mg, Daily    predniSONE (DELTASONE) 30 mg, Oral, Daily    tamsulosin (FLOMAX) 0.4 mg Cap Daily    zonisamide (ZONEGRAN) 100 MG Cap Take by mouth.     Past Surgical History:   Procedure Laterality Date    cardiac stents      LITHOTRIPSY       Family History   Problem Relation Name Age of Onset    Tuberculosis Mother      Alzheimer's disease Father      Heart attack Father         Social History     Tobacco Use    Smoking status: Never     Passive exposure: Never    Smokeless tobacco: Never  "  Substance Use Topics    Alcohol use: Yes     Alcohol/week: 3.0 standard drinks of alcohol     Types: 3 Shots of liquor per week     Comment: 1-2 times per week    Drug use: Never        Physical Exam      Vital Signs Reviewed   /85   Pulse 100   Temp 98.2 °F (36.8 °C)   Resp 18   Ht 5' 6" (1.676 m)   Wt 84.8 kg (187 lb)   SpO2 98%   BMI 30.18 kg/m²        Procedures    Procedures     Labs     Results for orders placed or performed during the hospital encounter of 10/01/24   Urinalysis, Reflex to Urine Culture    Collection Time: 10/01/24  3:49 AM    Specimen: Urine   Result Value Ref Range    Color, UA Light-Orange (A) Yellow, Light-Yellow, Colorless, Straw, Dark-Yellow    Appearance, UA Turbid (A) Clear    Specific Gravity, UA 1.036 (H) 1.005 - 1.030    pH, UA 5.5 5.0 - 8.5    Protein, UA 2+ (A) Negative    Glucose, UA Normal Negative, Normal    Ketones, UA Negative Negative    Blood, UA 3+ (A) Negative    Bilirubin, UA Negative Negative    Urobilinogen, UA Normal 0.2, 1.0, Normal    Nitrites, UA Negative Negative    Leukocyte Esterase, UA Negative Negative    RBC, UA >100 (A) None Seen, 0-2, 3-5, 0-5 /HPF    WBC, UA None Seen None Seen, 0-2, 3-5, 0-5 /HPF    Bacteria, UA None Seen None Seen, Trace /HPF    Budding Yeast, UA Many (A) None Seen /HPF    Squamous Epithelial Cells, UA Trace None Seen, Trace, Rare /HPF    Mucous, UA Moderate (A) None Seen /LPF    Non-Squamous Epithelial, UA Trace /HPF   CBC with Differential    Collection Time: 10/01/24  4:05 AM   Result Value Ref Range    WBC 10.57 4.50 - 11.50 x10(3)/mcL    RBC 5.15 4.70 - 6.10 x10(6)/mcL    Hgb 16.9 14.0 - 18.0 g/dL    Hct 49.9 42.0 - 52.0 %    MCV 96.9 (H) 80.0 - 94.0 fL    MCH 32.8 (H) 27.0 - 31.0 pg    MCHC 33.9 33.0 - 36.0 g/dL    RDW 12.3 11.5 - 17.0 %    Platelet 212 130 - 400 x10(3)/mcL    MPV 9.8 7.4 - 10.4 fL    Neut % 70.9 %    Lymph % 20.0 %    Mono % 7.8 %    Eos % 0.6 %    Basophil % 0.4 %    Lymph # 2.11 0.6 - 4.6 " Principal Discharge DX:	Visit for wound check   x10(3)/mcL    Neut # 7.51 2.1 - 9.2 x10(3)/mcL    Mono # 0.82 0.1 - 1.3 x10(3)/mcL    Eos # 0.06 0 - 0.9 x10(3)/mcL    Baso # 0.04 <=0.2 x10(3)/mcL    IG# 0.03 0 - 0.04 x10(3)/mcL    IG% 0.3 %    NRBC% 0.0 %   Comprehensive metabolic panel    Collection Time: 10/01/24  4:40 AM   Result Value Ref Range    Sodium 141 136 - 145 mmol/L    Potassium 4.6 3.5 - 5.1 mmol/L    Chloride 111 (H) 98 - 107 mmol/L    CO2 20 (L) 23 - 31 mmol/L    Glucose 130 (H) 82 - 115 mg/dL    Blood Urea Nitrogen 22.1 8.4 - 25.7 mg/dL    Creatinine 1.56 (H) 0.73 - 1.18 mg/dL    Calcium 9.6 8.8 - 10.0 mg/dL    Protein Total 8.1 (H) 5.8 - 7.6 gm/dL    Albumin 4.5 3.4 - 4.8 g/dL    Globulin 3.6 (H) 2.4 - 3.5 gm/dL    Albumin/Globulin Ratio 1.3 1.1 - 2.0 ratio    Bilirubin Total 0.8 <=1.5 mg/dL    ALP 74 40 - 150 unit/L    ALT 60 (H) 0 - 55 unit/L    AST 32 5 - 34 unit/L    eGFR 48 mL/min/1.73/m2    Anion Gap 10.0 mEq/L    BUN/Creatinine Ratio 14        Assessment:     1. Otalgia, unspecified laterality    2. Acute midline low back pain without sciatica    3. Dysfunction of Eustachian tube, unspecified laterality        Plan:   Medications sent to pharmacy  Continue Zyrtec and Flonase daily  You have been referred to ENT for further evaluation of your ear, they will call you for an appointment  Muscle relaxer and anti-inflammatory have been refilled.  Not drink alcohol or drive when taking the muscle relaxer.  Do not take any Advil Alleve Motrin ibuprofen naproxen with the anti-inflammatory.  You are being referred to physical therapy.  They will call you for an appointment.  Avoid heavy lifting or strenuous activities for the time being.  If your symptoms worsen seek medical attention immediately    Otalgia, unspecified laterality  -     Ambulatory referral/consult to ENT    Acute midline low back pain without sciatica  -     Ambulatory Referral/Consult to Physical Therapy    Dysfunction of Eustachian tube, unspecified laterality  -     Ambulatory  referral/consult to ENT    Other orders  -     metaxalone (SKELAXIN) 800 MG tablet; Take 1 tablet (800 mg total) by mouth 3 (three) times daily. for 10 days  Dispense: 30 tablet; Refill: 0  -     predniSONE (DELTASONE) 10 MG tablet; Take 3 tablets (30 mg total) by mouth once daily. for 5 days  Dispense: 15 tablet; Refill: 0  -     meloxicam (MOBIC) 15 MG tablet; Take 1 tablet (15 mg total) by mouth once daily. for 14 days  Dispense: 14 tablet; Refill: 0                     1

## 2025-01-07 ENCOUNTER — TELEPHONE (OUTPATIENT)
Dept: URGENT CARE | Facility: CLINIC | Age: 70
End: 2025-01-07
Payer: MEDICARE

## 2025-02-10 ENCOUNTER — OFFICE VISIT (OUTPATIENT)
Dept: NEUROLOGY | Facility: CLINIC | Age: 70
End: 2025-02-10
Payer: MEDICARE

## 2025-02-10 VITALS — SYSTOLIC BLOOD PRESSURE: 150 MMHG | HEART RATE: 84 BPM | DIASTOLIC BLOOD PRESSURE: 112 MMHG

## 2025-02-10 DIAGNOSIS — R56.9 SEIZURE: ICD-10-CM

## 2025-02-10 PROCEDURE — 99213 OFFICE O/P EST LOW 20 MIN: CPT | Mod: PBBFAC | Performed by: NURSE PRACTITIONER

## 2025-02-10 PROCEDURE — 99213 OFFICE O/P EST LOW 20 MIN: CPT | Mod: S$PBB,,, | Performed by: NURSE PRACTITIONER

## 2025-02-10 PROCEDURE — 99999 PR PBB SHADOW E&M-EST. PATIENT-LVL III: CPT | Mod: PBBFAC,,, | Performed by: NURSE PRACTITIONER

## 2025-02-10 NOTE — PROGRESS NOTES
Subjective:      Patient ID: Michael Larson is a 69 y.o. male.    Chief Complaint:  Seizures (2 yr f/u. Denies any seizure like activity . Patient decreased keppra to 750mg twice a day himself. )      History of Present Illness  Patient presents for follow up of seizures. LOV 2 years ago. First seizure occurred unprovoked in 1999 and then had recurrent seizure activity in 2008. Has not had seizure activity since. Has been on Zonegran in the past which caused side effects. He has decreased his Keppra to 750 mg BID on his own and still has not experienced any seizure activity. BP is elevated today but he keeps a BP log at home and it is normotensive at home.               Past Medical History:   Diagnosis Date    High cholesterol     History of heart attack     History of kidney stones     Hypertension     Seizures        Past Surgical History:   Procedure Laterality Date    cardiac stents      LITHOTRIPSY         Family History   Problem Relation Name Age of Onset    Tuberculosis Mother      Alzheimer's disease Father      Heart attack Father         Social History     Socioeconomic History    Marital status:    Tobacco Use    Smoking status: Never     Passive exposure: Never    Smokeless tobacco: Never   Substance and Sexual Activity    Alcohol use: Yes     Alcohol/week: 3.0 standard drinks of alcohol     Types: 3 Shots of liquor per week     Comment: 1-2 times per week    Drug use: Never    Sexual activity: Yes     Partners: Female       Current Outpatient Medications   Medication Sig Dispense Refill    amLODIPine (NORVASC) 5 MG tablet Take 5 mg by mouth.      aspirin (ECOTRIN) 81 MG EC tablet Take 81 mg by mouth once daily.      atorvastatin (LIPITOR) 20 MG tablet Take 20 mg by mouth every evening.      HYDROcodone-acetaminophen (NORCO)  mg per tablet Take 1 tablet by mouth every 6 (six) hours as needed for Pain. 12 tablet 0    HYDROmorphone (DILAUDID) 2 MG tablet Take 1 tablet (2 mg total) by mouth  every 4 (four) hours as needed for Pain. 18 tablet 0    levETIRAcetam (KEPPRA) 750 MG Tab Take 2 tablets (1,500 mg total) by mouth 2 (two) times daily. (Patient taking differently: Take 750 mg by mouth 2 (two) times daily.) 360 tablet 11    nitroGLYCERIN (NITROSTAT) 0.4 MG SL tablet Place 0.4 mg under the tongue every 5 (five) minutes as needed for Chest pain.      ondansetron (ZOFRAN-ODT) 8 MG TbDL Take 1 tablet (8 mg total) by mouth every 6 (six) hours as needed (nausea). 16 tablet 0    pantoprazole (PROTONIX) 40 MG tablet Take 40 mg by mouth once daily.      tamsulosin (FLOMAX) 0.4 mg Cap Take by mouth once daily.       No current facility-administered medications for this visit.       Review of patient's allergies indicates:  No Known Allergies       Vitals:    02/10/25 0840   BP: (!) 150/112   BP Location: Left arm   Patient Position: Sitting   Pulse: 84      Review of Systems  12 point ROS performed and negative unless otherwise documented in HPI  Objective:    Neurologic Exam      Mental Status   Oriented to person, place, and time.   Speech: speech is normal   Level of consciousness: alert     Cranial Nerves   Cranial nerves II through XII intact.      Motor Exam   Muscle bulk: normal     Strength   Strength 5/5 throughout.      Sensory Exam   Light touch normal.      Gait, Coordination, and Reflexes      Gait  Gait: normal     Physical Exam  Vitals and nursing note reviewed.   Pulmonary:      Effort: Pulmonary effort is normal.   Neurological:      Mental Status: He is oriented to person, place, and time.      Cranial Nerves: Cranial nerves 2-12 are intact.      Motor: Motor strength is normal.      Gait: Gait is intact.   Psychiatric:         Speech: Speech normal.       Assessment:     1. Seizure      Plan:   /80 at home this morning.  Continue Keppra; will wean to Keppra 500 mg BID when patient calls and is out of refills  Advised to call office with any seizure activity; can follow up with Neuro  in 2 years.

## 2025-02-18 DIAGNOSIS — R56.9 SEIZURES: ICD-10-CM

## 2025-02-18 RX ORDER — LEVETIRACETAM 750 MG/1
1500 TABLET ORAL 2 TIMES DAILY
Qty: 360 TABLET | Refills: 11 | Status: SHIPPED | OUTPATIENT
Start: 2025-02-18

## 2025-07-17 ENCOUNTER — OFFICE VISIT (OUTPATIENT)
Dept: FAMILY MEDICINE | Facility: CLINIC | Age: 70
End: 2025-07-17
Payer: MEDICARE

## 2025-07-17 VITALS
WEIGHT: 193.63 LBS | BODY MASS INDEX: 31.12 KG/M2 | HEART RATE: 80 BPM | TEMPERATURE: 98 F | RESPIRATION RATE: 15 BRPM | HEIGHT: 66 IN | OXYGEN SATURATION: 98 % | DIASTOLIC BLOOD PRESSURE: 92 MMHG | SYSTOLIC BLOOD PRESSURE: 134 MMHG

## 2025-07-17 DIAGNOSIS — N18.31 STAGE 3A CHRONIC KIDNEY DISEASE: ICD-10-CM

## 2025-07-17 DIAGNOSIS — I25.10 CORONARY ARTERY DISEASE INVOLVING NATIVE CORONARY ARTERY OF NATIVE HEART WITHOUT ANGINA PECTORIS: ICD-10-CM

## 2025-07-17 DIAGNOSIS — N20.0 RECURRENT NEPHROLITHIASIS: ICD-10-CM

## 2025-07-17 DIAGNOSIS — R56.9 SEIZURE: ICD-10-CM

## 2025-07-17 DIAGNOSIS — Z12.12 SCREENING FOR COLORECTAL CANCER: ICD-10-CM

## 2025-07-17 DIAGNOSIS — I10 PRIMARY HYPERTENSION: Primary | ICD-10-CM

## 2025-07-17 DIAGNOSIS — Z12.11 SCREENING FOR COLORECTAL CANCER: ICD-10-CM

## 2025-07-17 RX ORDER — LEVETIRACETAM 500 MG/1
500 TABLET ORAL 2 TIMES DAILY
COMMUNITY

## 2025-07-17 RX ORDER — KETOROLAC TROMETHAMINE 10 MG/1
10 TABLET, FILM COATED ORAL
COMMUNITY

## 2025-07-17 NOTE — LETTER
AUTHORIZATION FOR RELEASE OF   CONFIDENTIAL INFORMATION    Dear Dr. Mitchell,    We are seeing Michael Larson, date of birth 1955, in the clinic at [facility]. Marjorie Barragan MD is the patient's PCP. Michael Larson has an outstanding lab/procedure at the time we reviewed his chart. In order to help keep his health information updated, he has authorized us to request the following medical record(s):        (  )  MAMMOGRAM                                      (  )  COLONOSCOPY      (  )  PAP SMEAR                                          (  )  OUTSIDE LAB RESULTS     (  )  DEXA SCAN                                          (  )  EYE EXAM            (  )  FOOT EXAM                                          (  )  ENTIRE RECORD     (  )  OUTSIDE IMMUNIZATIONS                 ( X )  Entire Cardio workout, labs, notes and testing done last visits around 2024         Please fax records to [facility], Curet, Mary, MD, [phone].     If you have any questions, please contact [phone].          Patient Name: Michael Larson  : 1955  Patient Phone #: [phone]

## 2025-07-17 NOTE — LETTER
AUTHORIZATION FOR RELEASE OF   CONFIDENTIAL INFORMATION    Dear Dr. Hercules,    We are seeing Michael Larson, date of birth 1955, in the clinic at [facility]. Marjorie Barragan MD is the patient's PCP. Michael Larson has an outstanding lab/procedure at the time we reviewed his chart. In order to help keep his health information updated, he has authorized us to request the following medical record(s):        (  )  MAMMOGRAM                                      (  )  COLONOSCOPY      (  )  PAP SMEAR                                          (  )  OUTSIDE LAB RESULTS     (  )  DEXA SCAN                                          (  )  EYE EXAM            (  )  FOOT EXAM                                          ( X )  ENTIRE RECORD last 1 yr    (  )  OUTSIDE IMMUNIZATIONS                 (  )           Please fax records to [facility], Curet, Mary, MD, [phone].     If you have any questions, please contact [phone].          Patient Name: Michael Larson  : 1955  Patient Phone #: [phone]

## 2025-07-17 NOTE — ASSESSMENT & PLAN NOTE
Patient has had 2 episodes of seizure like activity in 1999 and 2008.  Unremarkable EEGs in the past.  He is followed by Neurology.  Continue Keppra 500 mg b.i.d..

## 2025-07-17 NOTE — LETTER
AUTHORIZATION FOR RELEASE OF   CONFIDENTIAL INFORMATION    Dear Dr. Servin's office,    We are seeing Michael Larson, date of birth 1955, in the clinic at [facility]. Marjorie Barragan MD is the patient's PCP. Michael Larson has an outstanding lab/procedure at the time we reviewed his chart. In order to help keep his health information updated, he has authorized us to request the following medical record(s):        (  )  MAMMOGRAM                                      (  )  COLONOSCOPY      (  )  PAP SMEAR                                          (  )  OUTSIDE LAB RESULTS     (  )  DEXA SCAN                                          (  )  EYE EXAM            (  )  FOOT EXAM                                          ( X )  ENTIRE RECORD last 1 year     (  )  OUTSIDE IMMUNIZATIONS                 (  )           Please fax records to [facility], Curet, Mary, [place], [phone].     If you have any questions, please contact [phone].          Patient Name: Michael Larson  : 1955  Patient Phone #: [phone]

## 2025-07-17 NOTE — ASSESSMENT & PLAN NOTE
Patient currently asymptomatic.  He has Dilaudid PRN for severe pain from kidney stones.  He gets about 2-3 per year.  Follow up with Urology as scheduled.

## 2025-07-17 NOTE — PROGRESS NOTES
[facility]  Marjorie Barragan MD    Patient ID: [id number]     Chief Complaint: Establish Care      HPI:     Michael Larson is a 69 y.o. male here today to establish care.  Patient has no concerns today and feels well.    Medical history:  CAD  HLD  HTN  Followed by Cardiology.  He has 3 coronary stents.  No recent chest pain or significant shortness of breath.    CKD Stage 3a  Nephrolithiasis  He gets 2-3 renal stones per year. Last passed some in October.  Patient has Norco and Dilaudid at home prescribed by Urology to take occasionally for severe kidney stone pain.    Seizure disorder  Patient has a history of 2 seizures in the past, 1 in 1999 in the other in 2008.  He is currently on Keppra 500 mg b.i.d..  Followed by Neurology.    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     Healthcare Maintenance  Colon cancer screening:  Never done, ordered today  Lung cancer screening:  Not indicated  Prostate cancer screening:  Followed by urology, we will request records  AAA screening:  Not indicated      Past Medical History:   Diagnosis Date    High cholesterol     History of heart attack     2020    History of kidney stones     Hypertension     Seizures         Past Surgical History:   Procedure Laterality Date    cardiac stents      LITHOTRIPSY          Social History     Tobacco Use    Smoking status: Never     Passive exposure: Never    Smokeless tobacco: Never   Substance and Sexual Activity    Alcohol use: Yes     Alcohol/week: 3.0 standard drinks of alcohol     Types: 3 Shots of liquor per week     Comment: 1-2 times per week    Drug use: Never    Sexual activity: Yes     Partners: Female        Current Outpatient Medications   Medication Instructions    amLODIPine (NORVASC) 5 mg    aspirin (ECOTRIN) 81  "mg, Daily    atorvastatin (LIPITOR) 20 mg, Nightly    HYDROmorphone (DILAUDID) 2 mg, Oral, Every 4 hours PRN    ketorolac (TORADOL) 10 mg, As needed (PRN)    levETIRAcetam (KEPPRA) 500 mg, 2 times daily    multivitamin with minerals tablet 1 tablet, Daily    nitroGLYCERIN (NITROSTAT) 0.4 mg, Every 5 min PRN    ondansetron (ZOFRAN-ODT) 8 mg, Oral, Every 6 hours PRN    tamsulosin (FLOMAX) 0.4 mg Cap Daily       Review of patient's allergies indicates:  No Known Allergies     Patient Care Team:  Marjorie Barragan MD as PCP - General (Family Medicine)     Subjective:     Review of Systems    12 point review of systems conducted, negative except as stated in the history of present illness. See HPI for details.    Objective:     Visit Vitals  BP (!) 134/92 (Patient Position: Sitting)   Pulse 80   Temp 98 °F (36.7 °C) (Oral)   Resp 15   Ht 5' 6" (1.676 m)   Wt 87.8 kg (193 lb 9.6 oz)   SpO2 98%   BMI 31.25 kg/m²       Physical Exam  Vitals and nursing note reviewed.   Constitutional:       General: He is not in acute distress.     Appearance: Normal appearance. He is not ill-appearing or diaphoretic.   HENT:      Head: Normocephalic and atraumatic.      Mouth/Throat:      Mouth: Mucous membranes are moist.      Pharynx: Oropharynx is clear.   Eyes:      Extraocular Movements: Extraocular movements intact.      Conjunctiva/sclera: Conjunctivae normal.   Cardiovascular:      Rate and Rhythm: Normal rate and regular rhythm.      Pulses: Normal pulses.      Heart sounds: No murmur heard.  Pulmonary:      Effort: Pulmonary effort is normal. No respiratory distress.      Breath sounds: Normal breath sounds. No wheezing, rhonchi or rales.   Musculoskeletal:      Cervical back: Neck supple.      Right lower leg: No edema.      Left lower leg: No edema.   Skin:     General: Skin is warm and dry.   Neurological:      General: No focal deficit present.      Mental Status: He is alert and oriented to person, place, and time. Mental status " is at baseline.   Psychiatric:         Mood and Affect: Mood normal.         Labs Reviewed:     Chemistry:  Lab Results   Component Value Date     10/01/2024    K 4.6 10/01/2024    BUN 22.1 10/01/2024    CREATININE 1.56 (H) 10/01/2024    EGFRNORACEVR 48 10/01/2024    CALCIUM 9.6 10/01/2024    ALKPHOS 74 10/01/2024    ALBUMIN 4.5 10/01/2024    BILIDIR 0.3 12/23/2020    IBILI 0.40 12/23/2020    AST 32 10/01/2024    ALT 60 (H) 10/01/2024    MG 2.07 07/30/2020        Lab Results   Component Value Date    HGBA1C 5.1 06/25/2022        Hematology:  Lab Results   Component Value Date    WBC 10.57 10/01/2024    HGB 16.9 10/01/2024    HCT 49.9 10/01/2024     10/01/2024       Lipid Panel:  Lab Results   Component Value Date    CHOL 115 06/25/2022    HDL 38 06/25/2022    LDL 57.00 06/25/2022    TRIG 99 06/25/2022    TOTALCHOLEST 3 06/25/2022        Urine:  Lab Results   Component Value Date    APPEARANCEUA Turbid (A) 10/01/2024    SGUA 1.036 (H) 10/01/2024    PROTEINUA 2+ (A) 10/01/2024    KETONESUA Negative 10/01/2024    LEUKOCYTESUR Negative 10/01/2024    RBCUA >100 (A) 10/01/2024    WBCUA None Seen 10/01/2024    BACTERIA None Seen 10/01/2024    SQEPUA Trace 10/01/2024        Assessment:       ICD-10-CM ICD-9-CM   1. Primary hypertension  I10 401.9   2. Stage 3a chronic kidney disease  N18.31 585.3   3. Screening for colorectal cancer  Z12.11 V76.51    Z12.12 V76.41   4. Recurrent nephrolithiasis  N20.0 592.0   5. Coronary artery disease involving native coronary artery of native heart without angina pectoris  I25.10 414.01   6. Seizure  R56.9 780.39        Plan:     1. Primary hypertension  Overview:  Amlodipine 5 mg daily    Assessment & Plan:  Well-controlled.  Continue current regimen.  Hypertension Medications              amLODIPine (NORVASC) 5 MG tablet Take 5 mg by mouth.    nitroGLYCERIN (NITROSTAT) 0.4 MG SL tablet Place 0.4 mg under the tongue every 5 (five) minutes as needed for Chest pain.           AHA/ACC goal <130/80. JNC8 goal <140/90 (all ages if DM or CKD OR <60), <150/90 (>60 w/o CKD or DM)  Low Sodium Diet (DASH Diet - Less than 2 grams of sodium per day).  Monitor blood pressure daily and log. Report consistent numbers greater than 140/90.  Maintain healthy weight with goal BMI <30. Exercise 30 minutes per day, 5 days per week.        2. Stage 3a chronic kidney disease  Assessment & Plan:  Lab Results   Component Value Date    EGFRNORACEVR 48 10/01/2024    EGFRNORACEVR 50 09/15/2022     Stable from renal standpoint.  Follow renoprotective measures including Renal Diet (reduce intake of nuts, peanut butter, milk, cheese, dried beans, peas) and Low Sodium Diet (less than 2 grams per day).  Avoid NSAIDs (Aleve, Mobic, Celebrex, Ibuprofen, Advil, Toradol and Diclofenac). May take Tylenol as needed for headache/pain.  Control DM with goal A1C <7. BP goal <130/80. LDL goal < 100.  Stay well hydrated. Avoid alcohol and soda. Limit tea and coffee.  Smoking Cessation recommended.        3. Screening for colorectal cancer  -     Cologuard Screening (Multitarget Stool DNA); Future; Expected date: 07/17/2025    4. Recurrent nephrolithiasis  Overview:  Followed by urology.    Assessment & Plan:  Patient currently asymptomatic.  He has Dilaudid PRN for severe pain from kidney stones.  He gets about 2-3 per year.  Follow up with Urology as scheduled.      5. Coronary artery disease involving native coronary artery of native heart without angina pectoris  Overview:  Amlodipine 5 mg daily  Lipitor 20 mg daily  Aspirin 81 mg daily  Nitroglycerin PRN    Assessment & Plan:  Stable.  Asymptomatic.  Continue follow up with Cardiology.      6. Seizure  Assessment & Plan:  Patient has had 2 episodes of seizure like activity in 1999 and 2008.  Unremarkable EEGs in the past.  He is followed by Neurology.  Continue Keppra 500 mg b.i.d..       Cardiology and Neurology are obtaining labs within the next month.  We will await  these results and see if we need to order anything else.    Follow up in about 6 months (around 1/17/2026). In addition to their scheduled follow up, the patient has also been instructed to follow up on as needed basis.     Future Appointments   Date Time Provider Department Center   1/22/2026  9:00 AM Marjorie Barragan MD [facility]   [facility]10/2026 11:30 [facility]        Marjorie Barragan MD

## 2025-07-17 NOTE — ASSESSMENT & PLAN NOTE
Well-controlled.  Continue current regimen.  Hypertension Medications              amLODIPine (NORVASC) 5 MG tablet Take 5 mg by mouth.    nitroGLYCERIN (NITROSTAT) 0.4 MG SL tablet Place 0.4 mg under the tongue every 5 (five) minutes as needed for Chest pain.          AHA/ACC goal <130/80. JNC8 goal <140/90 (all ages if DM or CKD OR <60), <150/90 (>60 w/o CKD or DM)  Low Sodium Diet (DASH Diet - Less than 2 grams of sodium per day).  Monitor blood pressure daily and log. Report consistent numbers greater than 140/90.  Maintain healthy weight with goal BMI <30. Exercise 30 minutes per day, 5 days per week.

## 2025-07-17 NOTE — ASSESSMENT & PLAN NOTE
Lab Results   Component Value Date    EGFRNORACEVR 48 10/01/2024    EGFRNORACEVR 50 09/15/2022     Stable from renal standpoint.  Follow renoprotective measures including Renal Diet (reduce intake of nuts, peanut butter, milk, cheese, dried beans, peas) and Low Sodium Diet (less than 2 grams per day).  Avoid NSAIDs (Aleve, Mobic, Celebrex, Ibuprofen, Advil, Toradol and Diclofenac). May take Tylenol as needed for headache/pain.  Control DM with goal A1C <7. BP goal <130/80. LDL goal < 100.  Stay well hydrated. Avoid alcohol and soda. Limit tea and coffee.  Smoking Cessation recommended.

## 2025-08-04 ENCOUNTER — RESULTS FOLLOW-UP (OUTPATIENT)
Dept: FAMILY MEDICINE | Facility: CLINIC | Age: 70
End: 2025-08-04
Payer: MEDICARE